# Patient Record
Sex: MALE | Race: WHITE | Employment: FULL TIME | URBAN - METROPOLITAN AREA
[De-identification: names, ages, dates, MRNs, and addresses within clinical notes are randomized per-mention and may not be internally consistent; named-entity substitution may affect disease eponyms.]

---

## 2017-08-30 ASSESSMENT — ACTIVITIES OF DAILY LIVING (ADL)
DO_MEMBERS_OF_YOUR_HOUSEHOLD_USE_SAFETY_HELMETS?: Y
ARE_THERE_FIREARMS_IN_YOUR_HOME?: N
ARE_THERE_CARBON_MONOXIDE_DETECTORS_IN_YOUR_HOME?: Y
ARE_THERE_SMOKE_DETECTORS_IN_YOUR_HOME?: Y
DO_MEMBERS_OF_YOUR_HOUSEHOLD_USE_SUNSCREEN?: Y
DO_MEMBERS_OF_YOUR_HOUSEHOLD_WEAR_SEAT_BELTS?: Y

## 2017-08-30 ASSESSMENT — ENCOUNTER SYMPTOMS
SKIN CHANGES: 0
POOR WOUND HEALING: 0
NAIL CHANGES: 1

## 2017-09-01 ENCOUNTER — OFFICE VISIT (OUTPATIENT)
Dept: FAMILY MEDICINE | Facility: CLINIC | Age: 37
End: 2017-09-01

## 2017-09-01 VITALS — RESPIRATION RATE: 20 BRPM | HEIGHT: 72 IN | WEIGHT: 165.8 LBS | OXYGEN SATURATION: 99 % | BODY MASS INDEX: 22.46 KG/M2

## 2017-09-01 DIAGNOSIS — R21 RASH AND NONSPECIFIC SKIN ERUPTION: Primary | ICD-10-CM

## 2017-09-01 ASSESSMENT — PAIN SCALES - GENERAL: PAINLEVEL: NO PAIN (0)

## 2017-09-01 NOTE — PROGRESS NOTES
"SUBJECTIVE:    Pt is a 37 year old male with pmh of     Patient Active Problem List   Diagnosis     Hydronephrosis, bilateral     Hearing loss     Imbalance       who is here for evaluation of had concerns including Physical.    Here for a physical.  Overall doing well. C/o mild red facial rash, new. Does get dandruff, uses special shampoo. Also, left great toe nail medial border getting discolored, using med his wife got from [place], topical, might be helping.     , two small children, works at [employer] in [place]--he will do free labs at [employer] for insurance and get me copy. Does flu shot at work.    See 2014 Dr Cabrera note for Urologic history summary; just prn f/u there    Could do more exercise, tough w/ job/kids, discussed options.    FH: mom thyroid disease, and possible heart trouble, dad also thyroid disease    Fatigue last year cleared when gave up coffee    Past Medical History:   Diagnosis Date     Hydronephrosis, bilateral      Past Surgical History:   Procedure Laterality Date     PYELOPLASTY       Allergies   Allergen Reactions     Nkda [No Known Drug Allergies]      Seasonal Allergies      \" congested sinuses\"           No current outpatient prescriptions on file.       Social History   Substance Use Topics     Smoking status: Never Smoker     Smokeless tobacco: Never Used     Alcohol use Yes      Comment: Glass of wine a couple times a week.         Answers for HPI/ROS submitted by the patient on 8/30/2017   Annual Exam:  General Symptoms: No  Skin Symptoms: Yes  HENT Symptoms: No  EYE SYMPTOMS: No  HEART SYMPTOMS: No  LUNG SYMPTOMS: No  INTESTINAL SYMPTOMS: No  URINARY SYMPTOMS: No  REPRODUCTIVE SYMPTOMS: No  SKELETAL SYMPTOMS: No  BLOOD SYMPTOMS: No  NERVOUS SYSTEM SYMPTOMS: No  MENTAL HEALTH SYMPTOMS: No  Changes in hair: No  Changes in moles/birth marks: No  Itching: No  Rashes: Yes  Changes in nails: Yes  Acne: No  Change in facial hair: No  Warts: No  Non-healing sores: No  Scarring: No  Flaking of " "skin: Yes  Color changes of hands/feet in cold : No  Sun sensitivity: No  Skin thickening: No  Frequency of exercise:: 2-3 days/week  Duration of exercise:: 15-30 minutes      OBJECTIVE:  Resp 20  Ht 1.822 m (5' 11.75\")  Wt 75.2 kg (165 lb 12.8 oz)  SpO2 99%  BMI 22.64 kg/m2  GENERAL APPEARANCE: Alert, no acute distress  EYES: PERRL, EOM normal, conjunctiva and lids normal  HENT: Ears and TMs normal, oral mucosa and posterior oropharynx normal  NECK: No adenopathy,masses or thyromegaly  RESP: lungs clear to auscultation   CV: normal rate, regular rhythm, no murmur or gallop  ABDOMEN: soft, no organomegaly, masses or tenderness   (male): penis, scrotum, testes normal, no hernias  LYMPHATICS: No cervical, supraclavicular or inguinal adenopathy  MS: extremities normal, no peripheral edema  SKIN: faint mac/pap rash between eyebrows/left nose border. Left great toe nail medial border white.  NEURO: Alert, oriented, speech and mentation normal  PSYCHE: mentation appears normal, affect and mood normal    ASSESSMENT/PLAN:    Rash: see derm, he'll ask about nail there too, could be fungal although just one side of nail  He'll do flu shot, lipids, sugar at work and get me copies labs  Add more exercise      FABIENNE LOPEZ MD      "

## 2017-09-01 NOTE — NURSING NOTE
Chief Complaint   Patient presents with     Physical     established physical   Mai Dinh LPN 8:03 AM on 9/1/2017

## 2017-09-01 NOTE — PATIENT INSTRUCTIONS
[facility] Medication Refill Request Information:  * Please contact your pharmacy regarding ANY request for medication refills.  ** [facility] Prescription Fax = [phone]  * Please allow 3 business days for routine medication refills.  * Please allow 5 business days for controlled substance medication refills.     [facility] Test Result notification information:  *You will be notified with in 7-10 days of your appointment day regarding the results of your test.  If you are on MyChart you will be notified as soon as the provider has reviewed the results and signed off on them.    [facility] [phone]

## 2017-09-01 NOTE — MR AVS SNAPSHOT
After Visit Summary   9/1/2017    Yuan Caldwell    MRN: [id number]           Patient Information     Date Of Birth          1980        Visit Information        Provider Department      9/1/2017 8:05 AM Evaritso Bonds MD [facility]        Today's Diagnoses     Rash and nonspecific skin eruption    -  1      Care Instructions    Primary Care Center Medication Refill Request Information:  * Please contact your pharmacy regarding ANY request for medication refills.  ** [facility] Prescription Fax = [phone]  * Please allow 3 business days for routine medication refills.  * Please allow 5 business days for controlled substance medication refills.     Primary Care Center Test Result notification information:  *You will be notified with in 7-10 days of your appointment day regarding the results of your test.  If you are on MyChart you will be notified as soon as the provider has reviewed the results and signed off on them.    [facility] [phone]           Follow-ups after your visit        Additional Services     DERMATOLOGY REFERRAL       Your provider has referred you to: [facility]: Dermatology Clinic [facility] [phone]   [email or web address]    Please be aware that coverage of these services is subject to the terms and limitations of your health insurance plan.  Call member services at your health plan with any benefit or coverage questions.      Please bring the following with you to your appointment:    (1) Any X-Rays, CTs or MRIs which have been performed.  Contact the facility where they were done to arrange for  prior to your scheduled appointment.    (2) List of current medications  (3) This referral request   (4) Any documents/labs given to you for this referral                  Who to contact     Please call your clinic at [phone] to:    Ask questions about your health    Make or cancel appointments    Discuss  "your medicines    Learn about your test results    Speak to your doctor   If you have compliments or concerns about an experience at your clinic, or if you wish to file a complaint, please contact [facility] Patient Relations at [phone] or email us at [email or web address]         Additional Information About Your Visit        MyChart Information     [employer] gives you secure access to your electronic health record. If you see a primary care provider, you can also send messages to your care team and make appointments. If you have questions, please call your primary care clinic.  If you do not have a primary care provider, please call [phone] and they will assist you.      [employer] is an electronic gateway that provides easy, online access to your medical records. With [employer], you can request a clinic appointment, read your test results, renew a prescription or communicate with your care team.     To access your existing account, please contact your [facility] or call [phone] for assistance.        Care EveryWhere ID     This is your Care EveryWhere ID. This could be used by other organizations to access your [place] medical records  [id number]        Your Vitals Were     Respirations Height Pulse Oximetry BMI (Body Mass Index)          20 1.822 m (5' 11.75\") 99% 22.64 kg/m2         Blood Pressure from Last 3 Encounters:   08/22/16 143/82   03/14/16 114/72   08/14/15 109/74    Weight from Last 3 Encounters:   09/01/17 75.2 kg (165 lb 12.8 oz)   08/22/16 74.7 kg (164 lb 9.6 oz)   03/14/16 72.6 kg (160 lb)              We Performed the Following     DERMATOLOGY REFERRAL        Primary Care Provider Office Phone # Fax #    Eavristo Bonds [phone]       [address] [address]        Equal Access to Services     MELANI NIELSEN AH: Suresh Calzada, sue motta, zo cavanaugh " yolanda gonzaleztierracuca la'aashanti ah. Erika [facility] [phone].    ATENCIÓN: Si habla xinañol, tiene a pimentel disposición servicios gratuitos de asistencia lingüística. Laura al [phone].    We comply with applicable federal civil rights laws and [place] laws. We do not discriminate on the basis of race, color, national origin, age, disability sex, sexual orientation or gender identity.            Thank you!     Thank you for choosing [facility]  for your care. Our goal is always to provide you with excellent care. Hearing back from our patients is one way we can continue to improve our services. Please take a few minutes to complete the written survey that you may receive in the mail after your visit with us. Thank you!             Your Updated Medication List - Protect others around you: Learn how to safely use, store and throw away your medicines at www.disposemymeds.org.      Notice  As of 9/1/2017  8:32 AM    You have not been prescribed any medications.

## 2017-11-16 ENCOUNTER — OFFICE VISIT (OUTPATIENT)
Dept: DERMATOLOGY | Facility: CLINIC | Age: 37
End: 2017-11-16

## 2017-11-16 DIAGNOSIS — Z79.899 ENCOUNTER FOR LONG-TERM (CURRENT) USE OF HIGH-RISK MEDICATION: ICD-10-CM

## 2017-11-16 DIAGNOSIS — Z79.899 ENCOUNTER FOR LONG-TERM (CURRENT) USE OF HIGH-RISK MEDICATION: Primary | ICD-10-CM

## 2017-11-16 DIAGNOSIS — B35.1 ONYCHOMYCOSIS: ICD-10-CM

## 2017-11-16 DIAGNOSIS — L21.9 DERMATITIS, SEBORRHEIC: ICD-10-CM

## 2017-11-16 DIAGNOSIS — R20.2 NOTALGIA PARESTHETICA: ICD-10-CM

## 2017-11-16 LAB
ALBUMIN SERPL-MCNC: 4 G/DL (ref 3.4–5)
ALP SERPL-CCNC: 81 U/L (ref 40–150)
ALT SERPL W P-5'-P-CCNC: 25 U/L (ref 0–70)
AST SERPL W P-5'-P-CCNC: 17 U/L (ref 0–45)
BILIRUB DIRECT SERPL-MCNC: 0.2 MG/DL (ref 0–0.2)
BILIRUB SERPL-MCNC: 0.9 MG/DL (ref 0.2–1.3)
PROT SERPL-MCNC: 7.4 G/DL (ref 6.8–8.8)

## 2017-11-16 RX ORDER — TERBINAFINE HYDROCHLORIDE 250 MG/1
250 TABLET ORAL DAILY
Qty: 30 TABLET | Refills: 2 | Status: SHIPPED | OUTPATIENT
Start: 2017-11-16 | End: 2018-09-10

## 2017-11-16 ASSESSMENT — PAIN SCALES - GENERAL: PAINLEVEL: NO PAIN (0)

## 2017-11-16 NOTE — MR AVS SNAPSHOT
After Visit Summary   11/16/2017    Yuan Caldwell    MRN: [id number]           Patient Information     Date Of Birth          1980        Visit Information        Provider Department      11/16/2017 8:30 AM Jigar Schwartz MD [facility]        Today's Diagnoses     Encounter for long-term (current) use of high-risk medication    -  1    Onychomycosis        Notalgia paresthetica        Dermatitis, seborrheic          Care Instructions    - Capsaicin cream for itching of spot on back.    - To lab today and again in 6 weeks.    - Start terbinafine          Follow-ups after your visit        Follow-up notes from your care team     Return in about 3 months (around 2/16/2018).      Your next 10 appointments already scheduled     Nov 16, 2017  9:15 AM CST   LAB with [facility] ([facility])    [address] [place] [id number] [phone]           Please do not eat 10-12 hours before your appointment if you are coming in fasting for labs on lipids, cholesterol, or glucose (sugar). This does not apply to pregnant women. Water, hot tea and black coffee (with nothing added) are okay. Do not drink other fluids, diet soda or chew gum.            Jan 04, 2018  9:30 AM CST   LAB with [facility] ([facility])    [address] [place] [id number] [phone]           Please do not eat 10-12 hours before your appointment if you are coming in fasting for labs on lipids, cholesterol, or glucose (sugar). This does not apply to pregnant women. Water, hot tea and black coffee (with nothing added) are okay. Do not drink other fluids, diet soda or chew gum.            Feb 15, 2018  9:00 AM CST   (Arrive by 8:45 AM)   Return Visit with Jigar Schwartz MD   [facility] ([facility])    [address] [place] [id number]    [phone]              Future tests that were ordered for you today     Open Future Orders        Priority Expected Expires Ordered    Hepatic panel Routine  11/16/2018 11/16/2017    Hepatic panel Routine 12/28/2017 1/4/2018 11/16/2017            Who to contact     Please call your clinic at [phone] to:    Ask questions about your health    Make or cancel appointments    Discuss your medicines    Learn about your test results    Speak to your doctor   If you have compliments or concerns about an experience at your clinic, or if you wish to file a complaint, please contact [facility] Patient Relations at [phone] or email us at [email or web address]         Additional Information About Your Visit        [employer] Information     [employer] gives you secure access to your electronic health record. If you see a primary care provider, you can also send messages to your care team and make appointments. If you have questions, please call your primary care clinic.  If you do not have a primary care provider, please call [phone] and they will assist you.      [employer] is an electronic gateway that provides easy, online access to your medical records. With [employer], you can request a clinic appointment, read your test results, renew a prescription or communicate with your care team.     To access your existing account, please contact your [facility] or call [phone] for assistance.        Care EveryWhere ID     This is your Care EveryWhere ID. This could be used by other organizations to access your [place] medical records  [id number]         Blood Pressure from Last 3 Encounters:   08/22/16 143/82   03/14/16 114/72   08/14/15 109/74    Weight from Last 3 Encounters:   09/01/17 75.2 kg (165 lb 12.8 oz)   08/22/16 74.7 kg (164 lb 9.6 oz)   03/14/16 72.6 kg (160 lb)                 Today's Medication Changes          These changes are  accurate as of: 11/16/17  9:01 AM.  If you have any questions, ask your nurse or doctor.               Start taking these medicines.        Dose/Directions    terbinafine 250 MG tablet   Commonly known as:  lamISIL   Used for:  Onychomycosis   Started by:  Jigar Schwartz MD        Dose:  250 mg   Take 1 tablet (250 mg) by mouth daily   Quantity:  30 tablet   Refills:  2            Where to get your medicines      These medications were sent to [facility] [address] [place] [address] [address] AT [facility]  [address], [facility] [address]     Phone:  [phone]     terbinafine 250 MG tablet                Primary Care Provider Office Phone # Fax #    Evaristo Bonds [phone]       [address] [address]        Equal Access to Services     [facility]: Hadii suzanna ku hadasho Soomaali, waaxda luqadaha, qaybta kaalmada adeegyada, waxay neshain hayaan beatriz nath . So [facility] [phone].    ATENCIÓN: Si habla español, tiene a pimentel disposición servicios gratuitos de asistencia lingüística. Llame al [phone].    We comply with applicable federal civil rights laws and [place] laws. We do not discriminate on the basis of race, color, national origin, age, disability, sex, sexual orientation, or gender identity.            Thank you!     Thank you for choosing [facility] DER[facility]  for your care. Our goal is always to provide you with excellent care. Hearing back from our patients is one way we can continue to improve our services. Please take a few minutes to complete the written survey that you may receive in the mail after your visit with us. Thank you!             Your Updated Medication List - Protect others around you: Learn how to safely use, store and throw away your medicines at www.disposemymeds.org.          This list is accurate as of: 11/16/17  9:01 AM.  Always use your most recent med list.                    Brand Name Dispense Instructions for use Diagnosis    terbinafine 250 MG tablet    lamISIL    30 tablet    Take 1 tablet (250 mg) by mouth daily    Onychomycosis

## 2017-11-16 NOTE — NURSING NOTE
Dermatology Rooming Note    Yuan Caldwell's goals for this visit include:   Chief Complaint   Patient presents with     Skin Check     Yuan is here today for a skin check and also a fungal infection on his great toe on the left foot.      RYDER Cates

## 2017-11-16 NOTE — PATIENT INSTRUCTIONS
- Capsaicin cream for itching of spot on back.    - To lab today and again in 6 weeks.    - Start terbinafine

## 2017-11-16 NOTE — PROGRESS NOTES
"[facility] Dermatology Note      Dermatology Problem List:  1.Seborrheic dermatitis: using Selsun Blue shampoo with good control  2. Onychomycosis: left great toe, terbinafine 250 mg daily x 3 months, started 11/16/2017  3. 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 Yuan Caldwell is a 37 year old male who presents as a referral from Dr. Bonds for evaluation of a rash. Patient reports that his rash has resolved at this point, but he is concerned about a toenail infection on his left foot. He would also like a full body skin check.    Regarding his rash, he says that he had a scaly red rash on his glabella that spread to just underneath his eye. He says it was there for at least two months, but noticed over the last two weeks that it completely went away.     He states that he also has a fungal infection of his left big toe. He says that a quarter of his toenail is affected and has been like this for 5 months. He applied a topical antifungal medication that his wife brought back from [facility]. He states that it didn't help his infection.    He denies any new or changing moles, itchy spots, or bleeding spots on his skin.    He is otherwise feeling healthy with no other concerns.      Past Medical History:   Patient Active Problem List   Diagnosis     Hydronephrosis, bilateral     Hearing loss     Imbalance     Past Medical History:   Diagnosis Date     Hydronephrosis, bilateral      Past Surgical History:   Procedure Laterality Date     PYELOPLASTY         Social History:  The patient works.    Family History:  No family hx of skin cancer.     Medications:  No current outpatient prescriptions on file.        Allergies   Allergen Reactions     Nkda [No Known Drug Allergies]      Seasonal Allergies      \" congested sinuses\"         Review of Systems:  -As per HPI  -Constitutional: The " patient denies fatigue, fevers, chills, unintended weight loss, and night sweats.  -HEENT: Patient denies nonhealing oral sores.  -Skin: As above in HPI. No additional skin concerns.    Physical exam:  Vitals: There were no vitals taken for this visit.  GEN: This is a well developed, well-nourished male in no acute distress, in a pleasant mood.    SKIN: Total skin excluding the undergarment areas was performed. The exam included the head/face, neck, both arms, chest, back, abdomen, both legs, digits and/or nails.   -Multiple regular brown pigmented macules and papules are identified on the scalp, face, extremities, and trunk.   -There is a large brown patch on the patient's left upper back that patient states has been stable in size since his last Dermatology visit  - There is a white crumbly  -No other lesions of concern on areas examined.     Impression/Plan:  1. Seborrheic dermatitis: patient uses Selsun blue shampoo for his scalp with good control of his dandruff. May continue to use Selsun blue as a face scrub if seborrhea returns on face.     2. Multiple benign nevi:    Patient reassured, no treatment indicated    3. Onychomycosis: left great toe    Start terbinafine 250 mg daily x 3 months    LFTs today and in 6 weeks    4. Hyperpigmented patch on left upper back: patient reports that this has been there for over 10 years and that it appeared during his adult life. DDx includes notalgia parastethica or post-inflammatory hyperpigmentation. Likely not Rizo's nevus or Cafe au lait given above history.    Patient may use capsaicin cream if itching becomes a concern.     CC Dr. Bonds on close of this encounter.  Follow-up in 3 months, earlier for new or changing lesions.       Dr. Sykes staffed the patient.    Staff Involved:  Resident(Jigar Schwartz)/Staff(as above)    I have personally examined this patient and agree with Dr. Schwartz' documentation and plan of care. I have reviewed and amended the resident's  note above. The documentation accurately reflects my clinical observations, diagnoses, treatment and follow-up plans.     Bailey Sykes MD  Dermatology Staff

## 2017-11-16 NOTE — LETTER
11/16/2017       RE: Yuan Caldwell  [address] [place] [address]     Dear Colleague,    Thank you for referring your patient, Yuan Caldwell, to the [facility] DERMATOLOGY at [facility]. Please see a copy of my visit note below.    [facility] Dermatology Note      Dermatology Problem List:  1.Seborrheic dermatitis: using Selsun Blue shampoo with good control  2. Onychomycosis: left great toe, terbinafine 250 mg daily x 3 months, started 11/16/2017  3. 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 Yuan Caldwell is a 37 year old male who presents as a referral from Dr. Bonds for evaluation of a rash. Patient reports that his rash has resolved at this point, but he is concerned about a toenail infection on his left foot. He would also like a full body skin check.    Regarding his rash, he says that he had a scaly red rash on his glabella that spread to just underneath his eye. He says it was there for at least two months, but noticed over the last two weeks that it completely went away.     He states that he also has a fungal infection of his left big toe. He says that a quarter of his toenail is affected and has been like this for 5 months. He applied a topical antifungal medication that his wife brought back from [facility]. He states that it didn't help his infection.    He denies any new or changing moles, itchy spots, or bleeding spots on his skin.    He is otherwise feeling healthy with no other concerns.      Past Medical History:   Patient Active Problem List   Diagnosis     Hydronephrosis, bilateral     Hearing loss     Imbalance     Past Medical History:   Diagnosis Date     Hydronephrosis, bilateral      Past Surgical History:   Procedure Laterality Date     PYELOPLASTY         Social History:  The patient works.    Family  "History:  No family hx of skin cancer.     Medications:  No current outpatient prescriptions on file.        Allergies   Allergen Reactions     Nkda [No Known Drug Allergies]      Seasonal Allergies      \" congested sinuses\"         Review of Systems:  -As per HPI  -Constitutional: The patient denies fatigue, fevers, chills, unintended weight loss, and night sweats.  -HEENT: Patient denies nonhealing oral sores.  -Skin: As above in HPI. No additional skin concerns.    Physical exam:  Vitals: There were no vitals taken for this visit.  GEN: This is a well developed, well-nourished male in no acute distress, in a pleasant mood.    SKIN: Total skin excluding the undergarment areas was performed. The exam included the head/face, neck, both arms, chest, back, abdomen, both legs, digits and/or nails.   -Multiple regular brown pigmented macules and papules are identified on the scalp, face, extremities, and trunk.   -There is a large brown patch on the patient's left upper back that patient states has been stable in size since his last Dermatology visit  - There is a white crumbly  -No other lesions of concern on areas examined.     Impression/Plan:  1. Seborrheic dermatitis: patient uses Selsun blue shampoo for his scalp with good control of his dandruff. May continue to use Selsun blue as a face scrub if seborrhea returns on face.     2. Multiple benign nevi:    Patient reassured, no treatment indicated    3. Onychomycosis: left great toe    Start terbinafine 250 mg daily x 3 months    LFTs today and in 6 weeks    4. Hyperpigmented patch on left upper back: patient reports that this has been there for over 10 years and that it appeared during his adult life. DDx includes notalgia parastethica or post-inflammatory hyperpigmentation. Likely not Rizo's nevus or Cafe au lait given above history.    Patient may use capsaicin cream if itching becomes a concern.     CC Dr. Bonds on close of this encounter.  Follow-up in 3 " months, earlier for new or changing lesions.       Dr. Sykes staffed the patient.    Staff Involved:  Resident(Jigar Schwatrz)/Staff(as above)    I have personally examined this patient and agree with Dr. Schwartz' documentation and plan of care. I have reviewed and amended the resident's note above. The documentation accurately reflects my clinical observations, diagnoses, treatment and follow-up plans.     Bailey Sykes MD  Dermatology Staff

## 2018-04-30 ENCOUNTER — OFFICE VISIT (OUTPATIENT)
Dept: FAMILY MEDICINE | Facility: CLINIC | Age: 38
End: 2018-04-30
Payer: COMMERCIAL

## 2018-04-30 VITALS
DIASTOLIC BLOOD PRESSURE: 76 MMHG | HEART RATE: 71 BPM | TEMPERATURE: 97.5 F | WEIGHT: 166.08 LBS | SYSTOLIC BLOOD PRESSURE: 131 MMHG | BODY MASS INDEX: 22.68 KG/M2 | OXYGEN SATURATION: 97 %

## 2018-04-30 DIAGNOSIS — L03.319 CELLULITIS OF TRUNK, UNSPECIFIED SITE OF TRUNK: Primary | ICD-10-CM

## 2018-04-30 RX ORDER — CEPHALEXIN 500 MG/1
500 CAPSULE ORAL 2 TIMES DAILY
Qty: 14 CAPSULE | Refills: 0 | Status: SHIPPED | OUTPATIENT
Start: 2018-04-30 | End: 2018-09-10

## 2018-04-30 ASSESSMENT — PAIN SCALES - GENERAL: PAINLEVEL: NO PAIN (0)

## 2018-04-30 NOTE — MR AVS SNAPSHOT
After Visit Summary   4/30/2018    Yuan Caldwell    MRN: [id number]           Patient Information     Date Of Birth          1980        Visit Information        Provider Department      4/30/2018 9:20 AM Bayron Ingram MD [facility]        Today's Diagnoses     Cellulitis of trunk, unspecified site of trunk    -  1       Follow-ups after your visit        Follow-up notes from your care team     Return if symptoms worsen or fail to improve.      Who to contact     Please call your clinic at [phone] to:    Ask questions about your health    Make or cancel appointments    Discuss your medicines    Learn about your test results    Speak to your doctor            Additional Information About Your Visit        [employer] Information     [address] gives you secure access to your electronic health record. If you see a primary care provider, you can also send messages to your care team and make appointments. If you have questions, please call your primary care clinic.  If you do not have a primary care provider, please call [phone] and they will assist you.      [address] is an electronic gateway that provides easy, online access to your medical records. With [address], you can request a clinic appointment, read your test results, renew a prescription or communicate with your care team.     To access your existing account, please contact your [facility] or call [phone] for assistance.        Care EveryWhere ID     This is your Care EveryWhere ID. This could be used by other organizations to access your [facility] medical records  [id number]        Your Vitals Were     Pulse Temperature Pulse Oximetry BMI (Body Mass Index)          71 97.5  F (36.4  C) (Oral) 97% 22.68 kg/m2         Blood Pressure from Last 3 Encounters:   04/30/18 131/76   08/22/16 143/82   03/14/16 114/72    Weight from Last 3 Encounters:   04/30/18 166 lb 1.3 oz (75.3 kg)   09/01/17 165 lb  12.8 oz (75.2 kg)   08/22/16 164 lb 9.6 oz (74.7 kg)              Today, you had the following     No orders found for display         Today's Medication Changes          These changes are accurate as of 4/30/18 10:52 AM.  If you have any questions, ask your nurse or doctor.               Start taking these medicines.        Dose/Directions    cephALEXin 500 MG capsule   Commonly known as:  KEFLEX   Used for:  Cellulitis of trunk, unspecified site of trunk        Dose:  500 mg   Take 1 capsule (500 mg) by mouth 2 times daily   Quantity:  14 capsule   Refills:  0            Where to get your medicines      These medications were sent to [facility] [address] [place] [address] [address] AT [facility] [employer] [place]  [address], [facility] [address]     Phone:  [phone]     cephALEXin 500 MG capsule                Primary Care Provider Office Phone # Fax #    Evaristo Bonds -[phone]        [address] [address]        Equal Access to Services     [facility] AH: Hadii aad ku hadasho Soomaali, waaxda luqadaha, qaybta kaalmada adeegyada, waxay idiin haypennie nath . So [facility] [phone].    ATENCIÓN: Si habla español, tiene a pimentel disposición servicios gratuitos de asistencia lingüística. Llame al [phone].    We comply with applicable federal civil rights laws and [place] laws. We do not discriminate on the basis of race, color, national origin, age, disability, sex, sexual orientation, or gender identity.            Thank you!     Thank you for choosing [facility]  for your care. Our goal is always to provide you with excellent care. Hearing back from our patients is one way we can continue to improve our services. Please take a few minutes to complete the written survey that you may receive in the mail after your visit with us. Thank you!             Your Updated Medication List - Protect others around you:  Learn how to safely use, store and throw away your medicines at www.disposemymeds.org.          This list is accurate as of 4/30/18 10:52 AM.  Always use your most recent med list.                   Brand Name Dispense Instructions for use Diagnosis    cephALEXin 500 MG capsule    KEFLEX    14 capsule    Take 1 capsule (500 mg) by mouth 2 times daily    Cellulitis of trunk, unspecified site of trunk       terbinafine 250 MG tablet    lamISIL    30 tablet    Take 1 tablet (250 mg) by mouth daily    Onychomycosis

## 2018-04-30 NOTE — PROGRESS NOTES
CHIEF COMPLAINT:  Possible boil on back.      HISTORY OF PRESENT ILLNESS:  Yuan is a 37 year old who normally sees Dr. Franko Bonds at the [facility] Center at the [facility] but he works just 3 blocks away from our clinic and has been here before.      He developed a somewhat painful lesion on his back approximately 48 hours ago.  It was somewhat painful over the weekend but it has gotten better.  His wife was concerned about it and wanted him to be seen.      He has no fever.  Otherwise, feels just fine.  He has never had anything quite like this in the past.      ACTIVE MEDICAL PROBLEMS:  Minimal and noted.      CURRENT MEDICATIONS:     1. None.      OBJECTIVE:  Yuan is in absolutely no distress.  He is afebrile with a temperature of 97.5.  O2 sats are 97% on room air.  Vital signs are stable.  Examination of the back reveals an area of erythema that runs vertically about 3 cm in height and approximately a little over 1 cm in width.  There seems to be a mole in the center of it that likely predates all of this.  The skin blanches easily when pressed.  There is no pus or discharge expressed upon palpation.  He is really not that tender today.      ASSESSMENT AND PLAN:   1. Mild cellulitis, back.  This is right over the spinous process and there is a small mole.  I suspect that dry winter skin irritated this a little bit and something like Staph-epidermidis is likely responsible.  Therefore, I am going to go ahead and give him a course of cephalexin 500 mg, 1 tablet twice daily for 7 days.  He can get both doses in today at this point and then stretch it out to every 12 hours for the remaining 6 days.  I took a photograph of it and measured it.  We can easily compare this if he needs to return.  His wife will keep an eye on it.     2. Call with any problems or questions.

## 2018-04-30 NOTE — NURSING NOTE
37 year old  Chief Complaint   Patient presents with     Derm Problem     red spot on the middle of his back,        Blood pressure 131/76, pulse 71, temperature 97.5  F (36.4  C), temperature source Oral, weight 166 lb 1.3 oz (75.3 kg), SpO2 97 %. Body mass index is 22.68 kg/(m^2).  Patient Active Problem List   Diagnosis     Hydronephrosis, bilateral     Hearing loss     Imbalance       Wt Readings from Last 2 Encounters:   04/30/18 166 lb 1.3 oz (75.3 kg)   09/01/17 165 lb 12.8 oz (75.2 kg)     BP Readings from Last 3 Encounters:   04/30/18 131/76   08/22/16 143/82   03/14/16 114/72         Current Outpatient Prescriptions   Medication     terbinafine (LAMISIL) 250 MG tablet     No current facility-administered medications for this visit.        Social History   Substance Use Topics     Smoking status: Never Smoker     Smokeless tobacco: Never Used     Alcohol use Yes      Comment: Glass of wine a couple times a week.       Health Maintenance Due   Topic Date Due     HIV SCREEN (SYSTEM ASSIGNED)  05/01/1996       No results found for: RADHA PughPKeithNKeith  April 30, 2018 9:31 AM

## 2018-09-10 ENCOUNTER — OFFICE VISIT (OUTPATIENT)
Dept: FAMILY MEDICINE | Facility: CLINIC | Age: 38
End: 2018-09-10
Payer: COMMERCIAL

## 2018-09-10 VITALS
SYSTOLIC BLOOD PRESSURE: 109 MMHG | RESPIRATION RATE: 20 BRPM | DIASTOLIC BLOOD PRESSURE: 72 MMHG | HEART RATE: 76 BPM | BODY MASS INDEX: 21.99 KG/M2 | WEIGHT: 161 LBS

## 2018-09-10 DIAGNOSIS — Z00.00 ROUTINE HISTORY AND PHYSICAL EXAMINATION OF ADULT: Primary | ICD-10-CM

## 2018-09-10 ASSESSMENT — PAIN SCALES - GENERAL: PAINLEVEL: NO PAIN (0)

## 2018-09-10 NOTE — MR AVS SNAPSHOT
After Visit Summary   9/10/2018    Yuan Caldwell    MRN: [id number]           Patient Information     Date Of Birth          1980        Visit Information        Provider Department      9/10/2018 8:20 AM Evaristo Bonds MD [facility]        Today's Diagnoses     Routine history and physical examination of adult    -  1      Care Instructions    Primary Care Center Medication Refill Request Information:  * Please contact your pharmacy regarding ANY request for medication refills.  ** [facility] Prescription Fax = [phone]  * Please allow 3 business days for routine medication refills.  * Please allow 5 business days for controlled substance medication refills.     Primary Care Center Test Result notification information:  *You will be notified with in 7-10 days of your appointment day regarding the results of your test.  If you are on MyChart you will be notified as soon as the provider has reviewed the results and signed off on them.    [facility]: [phone]           Follow-ups after your visit        Who to contact     Please call your clinic at [phone] to:    Ask questions about your health    Make or cancel appointments    Discuss your medicines    Learn about your test results    Speak to your doctor            Additional Information About Your Visit        MyChart Information     [facility] gives you secure access to your electronic health record. If you see a primary care provider, you can also send messages to your care team and make appointments. If you have questions, please call your primary care clinic.  If you do not have a primary care provider, please call [phone] and they will assist you.      [facility] is an electronic gateway that provides easy, online access to your medical records. With [facility], you can request a clinic appointment, read your test results, renew a prescription or communicate with your care team.     To access your existing  account, please contact your [facility] or call [phone] for assistance.        Care EveryWhere ID     This is your Care EveryWhere ID. This could be used by other organizations to access your [facility] medical records  [id number]        Your Vitals Were     Pulse Respirations BMI (Body Mass Index)             76 20 21.99 kg/m2          Blood Pressure from Last 3 Encounters:   09/10/18 109/72   04/30/18 131/76   08/22/16 143/82    Weight from Last 3 Encounters:   09/10/18 73 kg (161 lb)   04/30/18 75.3 kg (166 lb 1.3 oz)   09/01/17 75.2 kg (165 lb 12.8 oz)              Today, you had the following     No orders found for display       Primary Care Provider Office Phone # Fax #    Evaristo Bonds -[phone]       [address] [address]        Equal Access to Services     MELANI NIELSEN : Hadii suzanna borrego hadasho Sojw, waaxda luqadaha, qaybta kaalmada adeegyada, yolanda nath . So [facility] [phone].    ATENCIÓN: Si habla español, tiene a pimentel disposición servicios gratuitos de asistencia lingüística. Llame al [phone].    We comply with applicable federal civil rights laws and [place] laws. We do not discriminate on the basis of race, color, national origin, age, disability, sex, sexual orientation, or gender identity.            Thank you!     Thank you for choosing [facility]  for your care. Our goal is always to provide you with excellent care. Hearing back from our patients is one way we can continue to improve our services. Please take a few minutes to complete the written survey that you may receive in the mail after your visit with us. Thank you!             Your Updated Medication List - Protect others around you: Learn how to safely use, store and throw away your medicines at www.disposemymeds.org.      Notice  As of 9/10/2018  9:57 AM    You have not been prescribed any medications.

## 2018-09-10 NOTE — PROGRESS NOTES
"[facility]  Primary Care [place]   Evaristo Bonds MD  09/10/2018        Chief Complaint:   Physical       History of Present Illness:   Yuan Caldewll is a 38 year old male with a history of hydronephrosis who presents for a physical.    Fungal infection of nail: He visited dermatology on 11/16/17, for evaluation of an infection on his left big toe and seborrheic dermatitis. He was treated with selsun blue shampoo to treat the seborrheic dermatitis. Additionally, he was treated with terbinafine, but discontinued use soon after his appointment due to concerns of the effect the drug would have on his health. Alternatively he treated the infection with tea tree oil for about 6 weeks and notes that he does not notice it today.     Cellulitis: He visited Dr. Ingram on 04/30 for evaluation of cellulitis on back. Today, he notes that his infection is absent.     Daily Health: He denies that he exercises daily, although, he states that he walks 6 floors of steps everyday for work. He notes that he wishes he could do more exercise. He has noticed that he has had more fat \"building up\" on his body.     Recent labs: He had labs performed through the [facility] for work on insurance with results below.   Total Cholesterol- 231   HDL cholesterol- 30   LDL cholesterol- 180   Triglycerides- 105     Blood glucose- 82     Health Care Maintenance  1. Flu shot- discussed   2. See urology note from 2014. PRN for follow up.      Review of Systems:   Pertinent items are noted in HPI, remainder of complete ROS is negative.      Active Medications:   No current outpatient prescriptions on file.      Allergies:   Nkda [no known drug allergies] and Seasonal allergies      Past Medical History:  Hydronephrosis   Hearing loss  Imbalance     Past Surgical History:  Pyeloplasty     Family History:   Cancer      Social History:   The patient was alone   Smoking Status: never  Smokeless Tobacco: never   Alcohol Use: yes   2 " children ages 5 & 6   He works in IT     Physical Exam:   /72 (BP Location: Right arm, Patient Position: Sitting, Cuff Size: Adult Regular)  Pulse 76  Resp 20  Wt 73 kg (161 lb)  BMI 21.99 kg/m2   Constitutional: Alert. In no distress.  Head: Normocephalic. No masses, lesions, tenderness or abnormalities.  ENT: No neck nodes or sinus tenderness.  Cardiovascular: RRR. No murmurs, clicks, gallops, or rub.  Respiratory: Clear to auscultation bilaterally, no wheezes or crackles.  Gastrointestinal: Abdomen soft. Non-tender. BS normal. No masses or organomegaly.  : no inguinal hernias, normal scrotum, penis, testes normal, rectal exam normal prostate normal  Musculoskeletal: Extremities normal. No gross deformities noted. Normal muscle tone.  Skin:  Left shoulder blade birth adeel. Multiple pigmented regions. Seen 04/2018 for dermatology. No suspicious lesions. No rashes.  Neurologic: Gait normal. Reflexes normal and symmetric. Sensation grossly WNL.  Psychiatric: Mentation appears normal. Normal affect.   Hematologic/Lymphatic/Immunologic: Normal cervical lymph nodes.     Assessment and Plan:    Health Maintenance:   He will receive his annual flu shot through work.     High blood cholesterol:   Labs were performed through his work, and his recent LDL cholesterol levels were slightly high at 180. We discussed that he should exercise more often. He does have high blood pressure in the family. He will continue to have his labs performed through his work and bring me the copy for insurance purposes. I have decided to hold off on blood cholesterol  medication due to his lack of smoking, high blood pressure, diabetes and obesity.     Follow-up: One year        Scribe Disclosure:   I, Zahra Cooper, am serving as a scribe to document services personally performed by Evaristo Bonds MD at this visit, based upon the provider's statements to me. All documentation has been reviewed by the aforementioned  provider prior to being entered into the official medical record.     Portions of this medical record were completed by a scribe. UPON MY REVIEW AND AUTHENTICATION BY ELECTRONIC SIGNATURE, this confirms (a) I performed the applicable clinical services, and (b) the record is accurate.     Answers for HPI/ROS submitted by the patient on 9/7/2018   General Symptoms: No  Skin Symptoms: No  HENT Symptoms: No  EYE SYMPTOMS: No  HEART SYMPTOMS: No  LUNG SYMPTOMS: No  INTESTINAL SYMPTOMS: No  URINARY SYMPTOMS: No  REPRODUCTIVE SYMPTOMS: No  SKELETAL SYMPTOMS: No  BLOOD SYMPTOMS: No  NERVOUS SYSTEM SYMPTOMS: No  MENTAL HEALTH SYMPTOMS: No  Evaristo Bonds MD

## 2018-09-10 NOTE — PATIENT INSTRUCTIONS
[facility] Medication Refill Request Information:  * Please contact your pharmacy regarding ANY request for medication refills.  ** [facility] Prescription Fax = [phone]  * Please allow 3 business days for routine medication refills.  * Please allow 5 business days for controlled substance medication refills.     [facility] Test Result notification information:  *You will be notified with in 7-10 days of your appointment day regarding the results of your test.  If you are on MyChart you will be notified as soon as the provider has reviewed the results and signed off on them.    [facility]: [phone]

## 2018-09-10 NOTE — NURSING NOTE
Chief Complaint   Patient presents with     Physical     annual physical   Mai Dinh LPN 8:47 AM on 9/10/2018    Will get flu shot at [facility] .Mai Dinh LPN 8:47 AM on 9/10/2018

## 2019-07-08 ENCOUNTER — OFFICE VISIT (OUTPATIENT)
Dept: FAMILY MEDICINE | Facility: CLINIC | Age: 39
End: 2019-07-08
Payer: COMMERCIAL

## 2019-07-08 VITALS
SYSTOLIC BLOOD PRESSURE: 128 MMHG | BODY MASS INDEX: 22.26 KG/M2 | TEMPERATURE: 98.3 F | WEIGHT: 163 LBS | OXYGEN SATURATION: 97 % | HEART RATE: 83 BPM | DIASTOLIC BLOOD PRESSURE: 91 MMHG

## 2019-07-08 DIAGNOSIS — R07.0 THROAT PAIN: Primary | ICD-10-CM

## 2019-07-08 LAB — S PYO AG THROAT QL IA.RAPID: POSITIVE

## 2019-07-08 RX ORDER — PENICILLIN V POTASSIUM 500 MG/1
500 TABLET, FILM COATED ORAL 2 TIMES DAILY
Qty: 20 TABLET | Refills: 0 | Status: SHIPPED | OUTPATIENT
Start: 2019-07-08 | End: 2021-07-20

## 2019-07-08 RX ORDER — IBUPROFEN 400 MG/1
400 TABLET, FILM COATED ORAL EVERY 6 HOURS PRN
COMMUNITY
End: 2021-08-17

## 2019-07-08 NOTE — NURSING NOTE
39 year old  Chief Complaint   Patient presents with     Throat Problem     Sore throat since last Tuesday, not getting any better. Just got back from [place].     Ear Problem     pt reports his ears have been popping and ear pressure.        Blood pressure (!) 128/91, pulse 83, temperature 98.3  F (36.8  C), temperature source Oral, weight 73.9 kg (163 lb), SpO2 97 %. Body mass index is 22.26 kg/m .  Patient Active Problem List   Diagnosis     Hydronephrosis, bilateral     Hearing loss     Imbalance       Wt Readings from Last 2 Encounters:   07/08/19 73.9 kg (163 lb)   09/10/18 73 kg (161 lb)     BP Readings from Last 3 Encounters:   07/08/19 (!) 128/91   09/10/18 109/72   04/30/18 131/76         Current Outpatient Medications   Medication     ibuprofen (ADVIL/MOTRIN) 400 MG tablet     No current facility-administered medications for this visit.        Social History     Tobacco Use     Smoking status: Never Smoker     Smokeless tobacco: Never Used   Substance Use Topics     Alcohol use: Yes     Comment: Glass of wine a couple times a week.     Drug use: No       Health Maintenance Due   Topic Date Due     PHQ-2  01/01/2019       No results found for: PAP      July 8, 2019 8:56 AM

## 2019-07-08 NOTE — PATIENT INSTRUCTIONS
Yuan is a 40 yo with pharyngitis.    Results for orders placed or performed in visit on 07/08/19   Rapid Strep Screen (Group) ([place])   Result Value Ref Range    Rapid Strep A Screen POSITIVE (A) Negative     A/ Positive Strep. Still, question of carrier as he's had several positive tests in the past.     P/.  Treat with Penicillin, fluids, relative rest.   Let us know if no improvement   Also, return to clinic for a strep test while Asymptomatic and/or speak with Dr. Bonds.

## 2019-07-08 NOTE — PROGRESS NOTES
REASON FOR VISIT:  Throat Pain      HISTORY OF PRESENT ILLNESS: Yuan Caldwell is a 39 year old male who presents for throat pain, which began on 7/2/2019.  He reports sx began with a sore throat, which has worsened, along with occasional chills.  He notes some intermittent fatigue recently, stating he had to drink 2 cups of coffee this morning to feel fully awake.  He has not noticed swollen lymphnodes, but believes his tonsils have felt swollen over the past few days.  He notes hot beverages have helped, and notes sx fluctuate throughout the day.  He reports just returning from [facility], and has noticed his children get strep throat often.  He has noticed an increase in positive strep throat tests since he had children, particularly in the past 3-4 years.  No known medication allergies.  No headache, cough, stomach pain or fever.        SOCIAL HISTORY:   Social History     Social History Narrative    He does IT work, programming, for the [employer]           REVIEW OF SYSTEMS:  POSITIVE for sore throat, intermittent fatigue and occasional chills.  Negative for headache, cough, stomach pain or fever. Otherwise, the ROS is negative.      OBJECTIVE:      EXAM: Yuan is a pleasant 39 year old male.  VITAL SIGNS: BP (!) 128/91   Pulse 83   Temp 98.3  F (36.8  C) (Oral)   Wt 73.9 kg (163 lb)   SpO2 97%   BMI 22.26 kg/m    Constitutional: healthy, alert and no distress   Oropharynx: Tonsils at +3 with moderate erythema, no exudate.   Neck: Shotty lymphadenpathy on LEFT lymphatic chain  Cardiovascular: PMI normal. No lifts, heaves, or thrills. RRR. No murmurs, clicks gallops or rub  Respiratory: Clear bilaterally    Abdomen: Non-tender    LABORATORY DATA: see below      ASSESSMENT AND PLAN:   1.Yuan is a 38 yo with pharyngitis.    Results for orders placed or performed in visit on 07/08/19   Rapid Strep Screen (Group) ([facility])   Result Value Ref Range    Rapid Strep A Screen POSITIVE (A) Negative      A/ Positive Strep. Still, question of carrier as he's had several positive tests in the past.     P/.  Treat with Penicillin, fluids, relative rest.   Let us know if no improvement   Also, return to clinic for a strep test while Asymptomatic and/or speak with Dr. Bonds.      Call with any problems or questions.       I, Lake Martinez, am serving as a scribe to document services personally performed by Godfrey Mcneil MD based on data collection and the provider's statements to me. Dr. Mcneil has reviewed, edited and approved the above note.     --Godfrey Mcneil MD

## 2019-09-10 ASSESSMENT — ENCOUNTER SYMPTOMS
MEMORY LOSS: 1
SPEECH CHANGE: 0
WEAKNESS: 0
SEIZURES: 0
LOSS OF CONSCIOUSNESS: 0
DISTURBANCES IN COORDINATION: 0
NUMBNESS: 0
DIZZINESS: 1
PARALYSIS: 0
HEADACHES: 0
TINGLING: 0
TREMORS: 0

## 2019-09-11 ENCOUNTER — OFFICE VISIT (OUTPATIENT)
Dept: FAMILY MEDICINE | Facility: CLINIC | Age: 39
End: 2019-09-11
Payer: COMMERCIAL

## 2019-09-11 DIAGNOSIS — Z00.00 ROUTINE HISTORY AND PHYSICAL EXAMINATION OF ADULT: Primary | ICD-10-CM

## 2019-09-11 ASSESSMENT — PAIN SCALES - GENERAL: PAINLEVEL: NO PAIN (0)

## 2019-09-11 NOTE — NURSING NOTE
Chief Complaint   Patient presents with     Physical     Pt here for annual physcial      Raeann KARTIK Sin at 10:06 AM sign on 9/11/2019

## 2019-09-11 NOTE — PROGRESS NOTES
[facility]  Primary Care Center   Evaristo Bonds MD  09/11/2019      Chief Complaint:   Physical     History of Present Illness:   Yuan Caldwell is a 39 year old male who presents for an annual physical.  He has had no major health concerns in the past year.  He did have strep throat once or twice.  His father was here for a while over the summer visiting which was enjoyable.      He has biometric screening done through [facility] every year and has the most recent results with him.  3/12/19:  Total cholesterol 155  HDL cholesterol - 24  LDL cholesterol - 112  Triglycerides - 93  Blood glucose - 90    He does stretching exercises after showing in the morning.  He does not do dedicated cardio exercise however he is active at work during the day, does the stairs in his building several times a day, and will go for a walk if the weather permits.  He tries to eat fairly healthy.  He has cut down on meat, especially red meat, and is trying to eat more nuts, whole grains, eggs, and avocado.  In the past year, he has not been eating as many greens and vegetables compared to when they had a [employer] subscription.  He does eat fruit regularly.    Other concerns discussed:  1. He has a health care directive that he would like put on file.  He is Sabianist and does not want to receive blood products.      Review of Systems:   Pertinent items are noted in HPI or as in patient entered ROS below, remainder of complete ROS is negative.     Active Medications:      ibuprofen (ADVIL/MOTRIN) 400 MG tablet, Take 400 mg by mouth every 6 hours as needed for moderate pain, Disp: , Rfl:      Allergies:   No known drug allergies.       Past Medical History:  Hydronephrosis, bilateral  Hearing loss  Seasonal allergies     Past Surgical History:  Pyeloplasty     Family History:   Cancer - maternal grandfather, paternal grandmother   Father and mother are generally healthy  1 brother who is generally healthy     Social History:   Presents  to clinic alone  Tobacco Use: No previous or current tobacco use.   Alcohol Use: Glass of wine or beer occasionally  2 daughters - ages 6 and 8  Works in IT     Physical Exam:   BP (P) 137/79 (BP Location: Right arm, Patient Position: Sitting, Cuff Size: Adult Regular)   Pulse (P) 80   Wt (P) 74.9 kg (165 lb 3.2 oz)   SpO2 (P) 97%   BMI (P) 22.56 kg/m         Constitutional: Alert. In no distress.  Head: Normocephalic. No masses, lesions, tenderness or abnormalities.  ENT: No neck nodes or sinus tenderness.  Cardiovascular: RRR. No murmurs, clicks, gallops, or rub.  Respiratory: Clear to auscultation bilaterally, no wheezes or crackles.  Gastrointestinal: Abdomen soft. Non-tender. BS normal. No masses or organomegaly.  : no inguinal hernias, normal scrotum, penis, testes normal, rectal exam normal prostate normal      Musculoskeletal: Extremities normal. No gross deformities noted. Normal muscle tone.  Skin: No suspicious lesions. No rashes.  Neurologic: Gait normal. Reflexes normal and symmetric. Sensation grossly WNL.  Psychiatric: Mentation appears normal. Normal affect.   Hematologic/Lymphatic/Immunologic: Normal cervical lymph nodes.        Assessment and Plan:  There are no diagnoses linked to this encounter.   Excellent outside labs.  He will try to add in more regular exercise.  He will get a flu shot at work.    Follow-up: Return in about 1 year (around 9/11/2020).         Scribe Disclosure:  I, Mayra Harding, am serving as a scribe to document services personally performed by Evaristo Bonds MD at this visit, based upon the provider's statements to me. All documentation has been reviewed by the aforementioned provider prior to being entered into the official medical record.     Portions of this medical record were completed by a scribe. UPON MY REVIEW AND AUTHENTICATION BY ELECTRONIC SIGNATURE, this confirms (a) I performed the applicable clinical services, and (b) the record is accurate.      Answers for HPI/ROS submitted by the patient on 9/10/2019   General Symptoms: No  Skin Symptoms: No  HENT Symptoms: No  EYE SYMPTOMS: No  HEART SYMPTOMS: No  LUNG SYMPTOMS: No  INTESTINAL SYMPTOMS: No  URINARY SYMPTOMS: No  REPRODUCTIVE SYMPTOMS: No  SKELETAL SYMPTOMS: No  BLOOD SYMPTOMS: No  NERVOUS SYSTEM SYMPTOMS: Yes  MENTAL HEALTH SYMPTOMS: No  Trouble with coordination: No  Dizziness or trouble with balance: Yes  Fainting or black-out spells: No  Memory loss: Yes  Headache: No  Seizures: No  Speech problems: No  Tingling: No  Tremor: No  Weakness: No  Difficulty walking: No  Paralysis: No  Numbness: No  Evaritso Bonds MD MD

## 2019-09-16 ENCOUNTER — DOCUMENTATION ONLY (OUTPATIENT)
Dept: OTHER | Facility: CLINIC | Age: 39
End: 2019-09-16

## 2019-11-07 ENCOUNTER — HEALTH MAINTENANCE LETTER (OUTPATIENT)
Age: 39
End: 2019-11-07

## 2019-11-19 ENCOUNTER — OFFICE VISIT (OUTPATIENT)
Dept: FAMILY MEDICINE | Facility: CLINIC | Age: 39
End: 2019-11-19
Payer: COMMERCIAL

## 2019-11-19 VITALS
HEIGHT: 71 IN | TEMPERATURE: 97.6 F | SYSTOLIC BLOOD PRESSURE: 138 MMHG | HEART RATE: 67 BPM | RESPIRATION RATE: 16 BRPM | OXYGEN SATURATION: 98 % | DIASTOLIC BLOOD PRESSURE: 77 MMHG | BODY MASS INDEX: 24.04 KG/M2 | WEIGHT: 171.75 LBS

## 2019-11-19 DIAGNOSIS — J02.0 STREPTOCOCCAL SORE THROAT: Primary | ICD-10-CM

## 2019-11-19 LAB — S PYO AG THROAT QL IA.RAPID: NEGATIVE

## 2019-11-19 ASSESSMENT — MIFFLIN-ST. JEOR: SCORE: 1702.79

## 2019-11-19 NOTE — NURSING NOTE
"39 year old  Chief Complaint   Patient presents with     Pharyngitis     sore throat x 4 days, fatigue, cough x 2 weeks,        Blood pressure 138/77, pulse 67, temperature 97.6  F (36.4  C), temperature source Oral, resp. rate 16, height 1.798 m (5' 10.79\"), weight 77.9 kg (171 lb 12 oz), SpO2 98 %. Body mass index is 24.1 kg/m .  Patient Active Problem List   Diagnosis     Hydronephrosis, bilateral     Hearing loss     Imbalance     Patient is Episcopal       Wt Readings from Last 2 Encounters:   11/19/19 77.9 kg (171 lb 12 oz)   09/11/19 (P) 74.9 kg (165 lb 3.2 oz)     BP Readings from Last 3 Encounters:   11/19/19 138/77   09/11/19 (P) 137/79   07/08/19 (!) 128/91         Current Outpatient Medications   Medication     ibuprofen (ADVIL/MOTRIN) 400 MG tablet     penicillin V (VEETID) 500 MG tablet     No current facility-administered medications for this visit.        Social History     Tobacco Use     Smoking status: Never Smoker     Smokeless tobacco: Never Used   Substance Use Topics     Alcohol use: Yes     Comment: Glass of wine a couple times a week.     Drug use: No       Health Maintenance Due   Topic Date Due     INFLUENZA VACCINE (1) 09/01/2019       No results found for: PAP      November 19, 2019 11:27 AM    "

## 2019-11-19 NOTE — PROGRESS NOTES
SUBJECTIVE:   Yuan Caldwell is a 39 year old male who presents to clinic today to discuss the following problem(s).    Sore throat  - going on for the past 4 days  - low energy  - body aches  - daughter and wife have been sick but didn't need to go to the doctor  - a little bit of a cough that started 2.5 weeks ago, maybe getting a little better  - not sure of a fever as he has been taking ibuprofen regularly   - otherwise negative ROS as noted below    ROS:   CONSTITUTIONAL: Fatigue  ENT/MOUTH: Sore throat. Moderate nasal congestion, postnasal drainage and rhinorrhea  RESP: Cough  CV: NEGATIVE for chest pain/chest pressure, dyspnea on exertion  GI: NEGATIVE for abdominal pain, diarrhea, dysphagia and nausea  : NEGATIVE for dysuria, frequency and hematuria  MUSCULOSKELETAL: General body aches  HEME/ALLERGY/IMMUNE: Swollen nodes  PSYCHIATRIC: NEGATIVE    Today's PHQ-2:  PHQ-2 ( 1999 Pfizer) 11/19/2019 9/11/2019   Q1: Little interest or pleasure in doing things 0 1   Q2: Feeling down, depressed or hopeless 0 0   PHQ-2 Score 0 1       Past Medical History:   Diagnosis Date     Hydronephrosis, bilateral      Past Surgical History:   Procedure Laterality Date     PYELOPLASTY       Family History   Problem Relation Age of Onset     Cancer Maternal Grandfather      Cancer Paternal Grandmother      Social History     Tobacco Use     Smoking status: Never Smoker     Smokeless tobacco: Never Used   Substance Use Topics     Alcohol use: Yes     Comment: Glass of wine a couple times a week.     Drug use: No     Social History     Social History Narrative    He does IT work, programming, for the [employer]       Current Outpatient Medications   Medication     ibuprofen (ADVIL/MOTRIN) 400 MG tablet     penicillin V (VEETID) 500 MG tablet     No current facility-administered medications for this visit.      I have reviewed the patient's past medical, surgical, family, and social history.     OBJECTIVE:   /77  "(BP Location: Right arm, Patient Position: Sitting, Cuff Size: Adult Large)   Pulse 67   Temp 97.6  F (36.4  C) (Oral)   Resp 16   Ht 1.798 m (5' 10.79\")   Wt 77.9 kg (171 lb 12 oz)   SpO2 98%   BMI 24.10 kg/m      Constitutional: alert, appears stated age  Eyes: conjunctivae without erythema, sclera anicteric.  ENT: audible congestion, lymphedema, tonsillar erythema and swelling   Cardiac: regular rate and rhythm, normal S1/S2, no murmur/rubs/gallops  Respiratory: cough, lungs clear to auscultation bilaterally, normal work of breathing, no wheezes/crackles  Skin: no rashes, lesions, or wounds  Psych: affect is full and appropriate, speech is fluent and non-pressured    ASSESSMENT AND PLAN:     Yuan was seen today for pharyngitis.    Diagnoses and all orders for this visit:    Streptococcal sore throat  -     Rapid Strep Screen (Group) ([place])  -     Throat Culture Aerobic Bacterial    Negative rapid strep. Very low suspicion for PNA with no need for CXR at this point. Chief suspicion for viral URI. Discussed symptom management and advised patient of concerning symptoms indicating a need for further medical advice as noted in patient instructions.         Ryan Simon MD  [facility]  11/19/2019, 11:37 AM    "

## 2019-11-19 NOTE — PATIENT INSTRUCTIONS

## 2019-11-21 LAB
BACTERIA SPEC CULT: NORMAL
Lab: NORMAL
SPECIMEN SOURCE: NORMAL

## 2020-07-29 ENCOUNTER — MYC MEDICAL ADVICE (OUTPATIENT)
Dept: FAMILY MEDICINE | Facility: CLINIC | Age: 40
End: 2020-07-29

## 2020-07-29 DIAGNOSIS — J02.9 SORE THROAT: Primary | ICD-10-CM

## 2020-11-20 ENCOUNTER — OFFICE VISIT (OUTPATIENT)
Dept: FAMILY MEDICINE | Facility: CLINIC | Age: 40
End: 2020-11-20
Payer: COMMERCIAL

## 2020-11-20 VITALS
DIASTOLIC BLOOD PRESSURE: 89 MMHG | OXYGEN SATURATION: 97 % | SYSTOLIC BLOOD PRESSURE: 137 MMHG | WEIGHT: 173 LBS | HEART RATE: 94 BPM | BODY MASS INDEX: 24.27 KG/M2

## 2020-11-20 DIAGNOSIS — Z00.00 ROUTINE HISTORY AND PHYSICAL EXAMINATION OF ADULT: Primary | ICD-10-CM

## 2020-11-20 PROCEDURE — 99396 PREV VISIT EST AGE 40-64: CPT | Performed by: FAMILY MEDICINE

## 2020-11-20 ASSESSMENT — PAIN SCALES - GENERAL: PAINLEVEL: NO PAIN (0)

## 2020-11-20 NOTE — NURSING NOTE
Chief Complaint   Patient presents with     Physical     Pt would like physical exam today      KARTIK Thompson at 9:44 AM sign on 11/20/2020

## 2020-11-20 NOTE — PROGRESS NOTES
SUBJECTIVE:    Pt is a 40 year old male with pmh of     Patient Active Problem List   Diagnosis     Hydronephrosis, bilateral     Hearing loss     Imbalance     Patient is Yazidism       who is here for evaluation of had concerns including Physical (Pt would like physical exam today ).    Doing well  Works from home    No interval history    Some cervicalgia, he'll try massage let us know if not better    Mom, dad brother 2 kids alive/well    Does labs (lipid, sugar) at work to get insurance discount, he'll forward results    Diet: working on staying healthy in covid times  Exer: tries to do varied activities      Current Outpatient Medications   Medication Sig Dispense Refill     ibuprofen (ADVIL/MOTRIN) 400 MG tablet Take 400 mg by mouth every 6 hours as needed for moderate pain       penicillin V (VEETID) 500 MG tablet Take 1 tablet (500 mg) by mouth 2 times daily (Patient not taking: Reported on 9/11/2019) 20 tablet 0       Social History     Tobacco Use     Smoking status: Never Smoker     Smokeless tobacco: Never Used   Substance Use Topics     Alcohol use: Yes     Comment: Glass of wine a couple times a week.     Drug use: No       Ten pt ROS completed otherwise negative    OBJECTIVE:  /89 (BP Location: Right arm, Patient Position: Sitting, Cuff Size: Adult Large)   Pulse 94   Wt 78.5 kg (173 lb)   SpO2 97%   BMI 24.27 kg/m    GENERAL APPEARANCE: Alert, no acute distress  EYES: PERRL, EOM normal, conjunctiva and lids normal  HENT: Ears and TMs normal, oral mucosa and posterior oropharynx normal  NECK: No adenopathy,masses or thyromegaly  RESP: lungs clear to auscultation   CV: normal rate, regular rhythm, no murmur or gallop  ABDOMEN: soft, no organomegaly, masses or tenderness   (male): penis, scrotum, testes normal, no hernias  LYMPHATICS: No cervical, supraclavicular or inguinal adenopathy  MS: extremities normal, no peripheral edema  NEURO: Alert, oriented, speech and mentation  normal  PSYCHE: mentation appears normal, affect and mood normal  Right foot one plantar wart    ASSESSMENT/PLAN:    He'll work on diet/exericse in covid w/ winter coming  Forward labs via insurance  Yearly physicals  Wart he'll try otc and duct tape and let me know if not workign      FABIENNE LOPEZ MD

## 2021-03-25 ENCOUNTER — MYC MEDICAL ADVICE (OUTPATIENT)
Dept: FAMILY MEDICINE | Facility: CLINIC | Age: 41
End: 2021-03-25

## 2021-07-20 ENCOUNTER — OFFICE VISIT (OUTPATIENT)
Dept: FAMILY MEDICINE | Facility: CLINIC | Age: 41
End: 2021-07-20
Payer: COMMERCIAL

## 2021-07-20 VITALS
SYSTOLIC BLOOD PRESSURE: 151 MMHG | HEART RATE: 75 BPM | RESPIRATION RATE: 14 BRPM | OXYGEN SATURATION: 97 % | WEIGHT: 175.4 LBS | DIASTOLIC BLOOD PRESSURE: 100 MMHG | HEIGHT: 71 IN | BODY MASS INDEX: 24.56 KG/M2 | TEMPERATURE: 97.3 F

## 2021-07-20 DIAGNOSIS — R03.0 ELEVATED BLOOD PRESSURE READING WITHOUT DIAGNOSIS OF HYPERTENSION: ICD-10-CM

## 2021-07-20 DIAGNOSIS — J01.00 ACUTE NON-RECURRENT MAXILLARY SINUSITIS: ICD-10-CM

## 2021-07-20 DIAGNOSIS — H66.001 NON-RECURRENT ACUTE SUPPURATIVE OTITIS MEDIA OF RIGHT EAR WITHOUT SPONTANEOUS RUPTURE OF TYMPANIC MEMBRANE: Primary | ICD-10-CM

## 2021-07-20 ASSESSMENT — PAIN SCALES - GENERAL: PAINLEVEL: EXTREME PAIN (8)

## 2021-07-20 ASSESSMENT — MIFFLIN-ST. JEOR: SCORE: 1709.35

## 2021-07-20 NOTE — PATIENT INSTRUCTIONS
You can take 600 mg every 6-8 hours as needed with food.    You have been prescribed an antibiotic to treat a bacterial infection. Watch for signs of allergic reaction including swelling around the mouth or eyes and the development of a rash.  If you develop any of these symptoms, stop your medication, take a benadryl (25-50 mg) and give our office a call. Consider probiotic therapy to decrease the possibility of diarrhea associated with antibiotic use.    Follow up if you have any worsening or persistent problems or concerns.

## 2021-07-20 NOTE — PROGRESS NOTES
Assessment & Plan     Non-recurrent acute suppurative otitis media of right ear without spontaneous rupture of tympanic membrane  - amoxicillin-clavulanate (AUGMENTIN) 875-125 MG tablet; Take 1 tablet by mouth 2 times daily for 7 days    Acute non-recurrent maxillary sinusitis--resolving    Patient Instructions   You can take 600 mg every 6-8 hours as needed with food.    You have been prescribed an antibiotic to treat a bacterial infection. Watch for signs of allergic reaction including swelling around the mouth or eyes and the development of a rash.  If you develop any of these symptoms, stop your medication, take a benadryl (25-50 mg) and give our office a call. Consider probiotic therapy to decrease the possibility of diarrhea associated with antibiotic use.    Follow up if you have any worsening or persistent problems or concerns.        (R03.0) Elevated blood pressure reading without diagnosis of hypertension  Comment: Blood pressure elevated today though patient is in a significant amount of pain.  It continued to be high on recheck.  Recommended follow-up with primary care provider in the next 2 weeks.    Eun Plascencia PA-C  [facility]      Kirby Bolden is a 41 year old who presents for the following health issues     HPI     Acute Illness: Patient woke last night with severe right sided ear pain.  He has had a cold for the past 5 to 7 days with significant sinus congestion.  The congestion was improving in the last day or so before this pain occurred.  Family members have been sick as well.  Covid testing was negative.  Patient is otherwise in good health.  He takes no prescription medications.  His primary care provider is Dr. Bonds at the [facility].    Blood pressure notably high today.  Has been high in the clinic previously but he is in this significant pain today.. Patient denies symptoms associated with high blood pressure including blurred vision, headache, chest pain, heart  "palpitations, shortness of breath and pedal edema.    BP Readings from Last 6 Encounters:   07/20/21 (!) 151/100   11/20/20 137/89   11/19/19 138/77   09/11/19 (P) 137/79   07/08/19 (!) 128/91   09/10/18 109/72       Symptoms:  Fever: no  Chills/Sweats: no  Headache (location?): no  Sinus Pressure: YES  Conjunctivitis:  no  Ear Pain: YES: right  Rhinorrhea: YES  Congestion: YES  Sore Throat: Initially had a sore throat which has since resolved.  Cough: no  Wheeze: no  Decreased Appetite: no  Fatigue/Achiness: no  Sick/Strep Exposure: yes his children  Therapies tried and outcome: None      Review of Systems   Constitutional, HEENT, cardiovascular, pulmonary, gi and gu systems are negative, except as otherwise noted.      Objective    BP (!) 136/91   Pulse 75   Temp 97.3  F (36.3  C)   Resp 14   Ht 1.798 m (5' 10.79\")   Wt 79.6 kg (175 lb 6.4 oz)   SpO2 97%   BMI 24.61 kg/m    Body mass index is 24.61 kg/m .  Physical Exam   GENERAL: healthy, alert and no distress  EYES: Eyes grossly normal to inspection, PERRL and conjunctivae and sclerae normal  HENT: Right TM erythematous and bulging with purulent effusion.  Left TM normal-appearing.  Nasal congestion noted no facial tenderness with palpation.  Throat injected without inflammation or exudate  NECK: no adenopathy, no asymmetry, masses, or scars and thyroid normal to palpation  RESP: lungs clear to auscultation - no rales, rhonchi or wheezes  CV: regular rate and rhythm, normal S1 S2, no S3 or S4, no murmur, click or rub, no peripheral edema and peripheral pulses strong          "

## 2021-08-13 ENCOUNTER — MYC MEDICAL ADVICE (OUTPATIENT)
Dept: FAMILY MEDICINE | Facility: CLINIC | Age: 41
End: 2021-08-13

## 2021-08-13 NOTE — LETTER
Yuan Caldwell  [address] [place] [address]  : 1980          To Whom It May Concern:              Yuan Caldwell is a patient of mine and has had COVID-19 and has since recovered. He has also been fully vaccinated for COVID-19.           Sincerely,     Dr. Evaristo Bonds

## 2021-08-17 ENCOUNTER — OFFICE VISIT (OUTPATIENT)
Dept: OTOLARYNGOLOGY | Facility: CLINIC | Age: 41
End: 2021-08-17
Payer: COMMERCIAL

## 2021-08-17 ENCOUNTER — HOSPITAL ENCOUNTER (OUTPATIENT)
Dept: CARDIOLOGY | Facility: CLINIC | Age: 41
Discharge: HOME | End: 2021-08-17
Attending: FAMILY MEDICINE | Admitting: FAMILY MEDICINE
Payer: COMMERCIAL

## 2021-08-17 VITALS — HEIGHT: 71 IN | BODY MASS INDEX: 24.5 KG/M2 | WEIGHT: 175 LBS

## 2021-08-17 DIAGNOSIS — H90.41 SENSORINEURAL HEARING LOSS (SNHL) OF RIGHT EAR WITH UNRESTRICTED HEARING OF LEFT EAR: ICD-10-CM

## 2021-08-17 DIAGNOSIS — H83.3X1 NOISE-INDUCED HEARING LOSS OF RIGHT EAR WITH UNRESTRICTED HEARING OF LEFT EAR: Primary | ICD-10-CM

## 2021-08-17 DIAGNOSIS — R03.0 ELEVATED BLOOD PRESSURE READING WITHOUT DIAGNOSIS OF HYPERTENSION: ICD-10-CM

## 2021-08-17 PROCEDURE — 92504 EAR MICROSCOPY EXAMINATION: CPT | Performed by: REGISTERED NURSE

## 2021-08-17 PROCEDURE — 93788 AMBL BP MNTR W/SW A/R: CPT

## 2021-08-17 PROCEDURE — 93790 AMBL BP MNTR W/SW I&R: CPT | Performed by: INTERNAL MEDICINE

## 2021-08-17 PROCEDURE — 99203 OFFICE O/P NEW LOW 30 MIN: CPT | Mod: 25 | Performed by: REGISTERED NURSE

## 2021-08-17 ASSESSMENT — MIFFLIN-ST. JEOR: SCORE: 1710.92

## 2021-08-17 ASSESSMENT — PAIN SCALES - GENERAL: PAINLEVEL: NO PAIN (0)

## 2021-08-17 NOTE — PROGRESS NOTES
"  Otolaryngology Clinic  August 17, 2021    Chief Complaint:   Right otitis media       History of Present Illness:   Yuan Caldwell is a 41 year old male who presents today for evaluation of right otitis media. Patient was seen in his Primary Care Clinic on 7/20/21 due to acute onset of severe right otalgia and otorrhea. He was found to have otitis media of the right ear without a rupture and acute maxillary sinusitis. He was placed on a 7 day course of Augmentin. Patient reported improvement of right otalgia and sinus pressure pain. Continued to have decrease hearing on the right. This has slowly improved over the past couple of weeks. Now has crackling sound and ear popping on the right side when blowing his nose. Reports that hearing is almost to baseline. Patient is flying in about a month and is concerned that he will have increased ear pain due to recent infection.     Patient has been seen in the Otolaryngology clinic in the past by Dr. Perales for vertigo and right sensorineural hearing loss. Patient states that he has not had any frequent dizziness episodes. The last episode was about 2 months ago and was brief, lasting 10-15 seconds. Right ear hearing seems to be stable, however, he has not had an audiogram since 2013.    Patient does have a history of frequent ear infections as a child, no history of any ear surgeries.     Past Medical History:  Past Medical History:   Diagnosis Date     Hydronephrosis, bilateral      Past Surgical History:  Past Surgical History:   Procedure Laterality Date     PYELOPLASTY       Medications:  Current Outpatient Medications   Medication Sig Dispense Refill     ibuprofen (ADVIL/MOTRIN) 400 MG tablet Take 400 mg by mouth every 6 hours as needed for moderate pain       Allergies:  Allergies   Allergen Reactions     Nkda [No Known Drug Allergies]      Seasonal Allergies      \" congested sinuses\"      Social History:  Social History     Tobacco Use     Smoking status: Never " "Smoker     Smokeless tobacco: Never Used   Substance Use Topics     Alcohol use: Yes     Comment: Glass of wine a couple times a week.     Drug use: No       ROS: 10 point ROS neg other than the symptoms noted above in the HPI.    Physical Exam:    Ht 1.803 m (5' 11\")   Wt 79.4 kg (175 lb)   BMI 24.41 kg/m       Constitutional:  The patient was unaccompanied, well-groomed, and in no acute distress.     Neurologic: Alert and oriented x 3.    Psychiatric: The patient's affect was calm, cooperative, and appropriate.     Communication:  Normal; communicates verbally, normal voice quality.    Respiratory: Breathing comfortably without stridor or exertion of accessory muscles.    Ears: Pinnae and tragus non-tender.  Keene: midline. Rinne: A>B bilaterally.      Otologic microscope exam:    Patient's ear pathology required use of the binocular microscope for the purpose of cleaning and improving visualization in order to assure a more accurate diagnostic evaluation.    Right ear was examined under the microscope.  Normal appearing TM, nicely aerated middle ear space. There is no fluid or perforation.     Left ear was also examined under the microscope.  Normal appearing TM, nicely aerated middle ear space.         Audiogram: 5/22/2013- data independently reviewed  Right ear: Borderline normal sloping to severe SNHL at 8kHz   Left ear: Normal hearing   WR right: 100% @ 60 dB left: 100% @ 50 dB  Acoustic Reflexes: present in all conditions at 1K  Tympanograms: type A right, type A left        Assessment and Plan:  1. History of acute otitis media of right ear  Patient with history of acute otitis media of the right ear that was successfully treated with oral abx. There is no evidence of middle ear effusion on today's microscope exam. Keene is midline. Discussed with patient that middle ear infections do take time to heal and hearing may not return to baseline for up to 3 months after infection. Patient is concerned about " ear pain with flying. Discussed using Afrin and ear plugs during flight to prevent negative pressure in the ear.     2. Sensorineural hearing loss (SNHL) of right ear with unrestricted hearing of left ear  Patient with history of SNHL of the right ear. Has not had an audiogram since 2013. Recommend repeat hearing test for ongoing monitoring. Patient may find benefit with amplification but may not want to trial aid until he is around more crowded environments again.      Patient will follow up as needed for any new hearing loss or ear symptoms.     Candie Teixeira DNP, APRN, CNP  Otolaryngology  Head & Neck Surgery  [phone]    30 minutes spent on the date of the encounter doing chart review, history and exam, documentation and further activities per the note

## 2021-08-17 NOTE — LETTER
8/17/2021       RE: Yuan Caldwell  [address] [place] [facility] [address]     Dear Colleague,    Thank you for referring your patient, Yuan Caldwell, to the [facility] EAR NOSE AND THROAT CLINIC [place] at [facility]. Please see a copy of my visit note below.      Otolaryngology Clinic  August 17, 2021    Chief Complaint:   Right otitis media       History of Present Illness:   Yuan Caldwell is a 41 year old male who presents today for evaluation of right otitis media. Patient was seen in his Primary Care Clinic on 7/20/21 due to acute onset of severe right otalgia and otorrhea. He was found to have otitis media of the right ear without a rupture and acute maxillary sinusitis. He was placed on a 7 day course of Augmentin. Patient reported improvement of right otalgia and sinus pressure pain. Continued to have decrease hearing on the right. This has slowly improved over the past couple of weeks. Now has crackling sound and ear popping on the right side when blowing his nose. Reports that hearing is almost to baseline. Patient is flying in about a month and is concerned that he will have increased ear pain due to recent infection.     Patient has been seen in the Otolaryngology clinic in the past by Dr. Perales for vertigo and right sensorineural hearing loss. Patient states that he has not had any frequent dizziness episodes. The last episode was about 2 months ago and was brief, lasting 10-15 seconds. Right ear hearing seems to be stable, however, he has not had an audiogram since 2013.    Patient does have a history of frequent ear infections as a child, no history of any ear surgeries.     Past Medical History:  Past Medical History:   Diagnosis Date     Hydronephrosis, bilateral      Past Surgical History:  Past Surgical History:   Procedure Laterality Date     PYELOPLASTY       Medications:  Current Outpatient Medications   Medication Sig Dispense  "Refill     ibuprofen (ADVIL/MOTRIN) 400 MG tablet Take 400 mg by mouth every 6 hours as needed for moderate pain       Allergies:  Allergies   Allergen Reactions     Nkda [No Known Drug Allergies]      Seasonal Allergies      \" congested sinuses\"      Social History:  Social History     Tobacco Use     Smoking status: Never Smoker     Smokeless tobacco: Never Used   Substance Use Topics     Alcohol use: Yes     Comment: Glass of wine a couple times a week.     Drug use: No       ROS: 10 point ROS neg other than the symptoms noted above in the HPI.    Physical Exam:    Ht 1.803 m (5' 11\")   Wt 79.4 kg (175 lb)   BMI 24.41 kg/m       Constitutional:  The patient was unaccompanied, well-groomed, and in no acute distress.     Neurologic: Alert and oriented x 3.    Psychiatric: The patient's affect was calm, cooperative, and appropriate.     Communication:  Normal; communicates verbally, normal voice quality.    Respiratory: Breathing comfortably without stridor or exertion of accessory muscles.    Ears: Pinnae and tragus non-tender.  Keene: midline. Rinne: A>B bilaterally.      Otologic microscope exam:    Patient's ear pathology required use of the binocular microscope for the purpose of cleaning and improving visualization in order to assure a more accurate diagnostic evaluation.    Right ear was examined under the microscope.  Normal appearing TM, nicely aerated middle ear space. There is no fluid or perforation.     Left ear was also examined under the microscope.  Normal appearing TM, nicely aerated middle ear space.         Audiogram: 5/22/2013- data independently reviewed  Right ear: Borderline normal sloping to severe SNHL at 8kHz   Left ear: Normal hearing   WR right: 100% @ 60 dB left: 100% @ 50 dB  Acoustic Reflexes: present in all conditions at 1K  Tympanograms: type A right, type A left        Assessment and Plan:  1. History of acute otitis media of right ear  Patient with history of acute otitis media " of the right ear that was successfully treated with oral abx. There is no evidence of middle ear effusion on today's microscope exam. Keene is midline. Discussed with patient that middle ear infections do take time to heal and hearing may not return to baseline for up to 3 months after infection. Patient is concerned about ear pain with flying. Discussed using Afrin and ear plugs during flight to prevent negative pressure in the ear.     2. Sensorineural hearing loss (SNHL) of right ear with unrestricted hearing of left ear  Patient with history of SNHL of the right ear. Has not had an audiogram since 2013. Recommend repeat hearing test for ongoing monitoring. Patient may find benefit with amplification but may not want to trial aid until he is around more crowded environments again.      Patient will follow up as needed for any new hearing loss or ear symptoms.     Candie Teixeira DNP, APRN, CNP  Otolaryngology  Head & Neck Surgery  [phone]    30 minutes spent on the date of the encounter doing chart review, history and exam, documentation and further activities per the note

## 2021-08-17 NOTE — NURSING NOTE
"Chief Complaint   Patient presents with     RECHECK     ear infection follow up       Height 1.803 m (5' 11\"), weight 79.4 kg (175 lb).    Fay Riggins, EMT    "

## 2021-08-17 NOTE — PATIENT INSTRUCTIONS
- You were seen in the ENT Clinic today by Candie Teixeira NP.   - Please schedule audiogram at your convenience.  - You can use Afrin nasal spray every 12 hours before flying. Do not use more than 3 days due to risk of rebound nasal congestion.  - You can also use ear plugs when flying to prevent ear pain and fullness.  - Please send a [employer] message or call the ENT clinic at [phone] with any questions or concerns.

## 2021-08-18 ENCOUNTER — OFFICE VISIT (OUTPATIENT)
Dept: AUDIOLOGY | Facility: CLINIC | Age: 41
End: 2021-08-18
Payer: COMMERCIAL

## 2021-08-18 DIAGNOSIS — H90.41 SENSORINEURAL HEARING LOSS (SNHL) OF RIGHT EAR WITH UNRESTRICTED HEARING OF LEFT EAR: ICD-10-CM

## 2021-08-18 DIAGNOSIS — H90.71 MIXED CONDUCTIVE AND SENSORINEURAL HEARING LOSS OF RIGHT EAR WITH UNRESTRICTED HEARING OF LEFT EAR: ICD-10-CM

## 2021-08-18 PROCEDURE — 92557 COMPREHENSIVE HEARING TEST: CPT | Performed by: AUDIOLOGIST

## 2021-08-18 PROCEDURE — 92550 TYMPANOMETRY & REFLEX THRESH: CPT | Performed by: AUDIOLOGIST

## 2021-08-18 PROCEDURE — 92565 STENGER TEST PURE TONE: CPT | Performed by: AUDIOLOGIST

## 2021-08-18 NOTE — PROGRESS NOTES
AUDIOLOGY REPORT    SUMMARY: Audiology visit completed. See audiogram for results.      RECOMMENDATIONS: Follow-up with ENT.    Jessica Azar. CCC-A  Licensed Audiologist   [place] #[id number]

## 2021-08-24 ENCOUNTER — MYC MEDICAL ADVICE (OUTPATIENT)
Dept: FAMILY MEDICINE | Facility: CLINIC | Age: 41
End: 2021-08-24

## 2021-09-25 ENCOUNTER — HEALTH MAINTENANCE LETTER (OUTPATIENT)
Age: 41
End: 2021-09-25

## 2021-12-01 ENCOUNTER — OFFICE VISIT (OUTPATIENT)
Dept: FAMILY MEDICINE | Facility: CLINIC | Age: 41
End: 2021-12-01
Payer: COMMERCIAL

## 2021-12-01 VITALS
OXYGEN SATURATION: 98 % | DIASTOLIC BLOOD PRESSURE: 81 MMHG | WEIGHT: 168 LBS | BODY MASS INDEX: 23.52 KG/M2 | HEART RATE: 83 BPM | RESPIRATION RATE: 16 BRPM | HEIGHT: 71 IN | SYSTOLIC BLOOD PRESSURE: 125 MMHG

## 2021-12-01 DIAGNOSIS — H91.90 HEARING LOSS, UNSPECIFIED HEARING LOSS TYPE, UNSPECIFIED LATERALITY: Primary | ICD-10-CM

## 2021-12-01 PROCEDURE — 99396 PREV VISIT EST AGE 40-64: CPT | Performed by: FAMILY MEDICINE

## 2021-12-01 ASSESSMENT — PAIN SCALES - GENERAL: PAINLEVEL: NO PAIN (0)

## 2021-12-01 ASSESSMENT — MIFFLIN-ST. JEOR: SCORE: 1679.17

## 2021-12-01 NOTE — NURSING NOTE
HPI Comments:   Here for suture removal  Placed 8 or 9 days ago. Had 8 inside lower lip and 8 on chin. The 8 on chin \"fell out\" but wound is healed without concern. Denies pain, fever, discharge. Healing well. No pain. Patient is a 19 y.o. male presenting with suture removal.   Suture Removal          Past Medical History:   Diagnosis Date    ADD (attention deficit disorder)     Asthma     allergy induced    H/O seasonal allergies     Nausea & vomiting         Past Surgical History:   Procedure Laterality Date    HX GI      bell clapper deformity    HX TONSIL AND ADENOIDECTOMY      HX TYMPANOSTOMY      HX TYMPANOSTOMY           Family History   Problem Relation Age of Onset    Hypertension Mother     Asthma Father     Other Brother      pituitaty adenoma    Anesth Problems Neg Hx         Social History     Social History    Marital status: SINGLE     Spouse name: N/A    Number of children: N/A    Years of education: N/A     Occupational History    Not on file. Social History Main Topics    Smoking status: Current Every Day Smoker     Packs/day: 0.25    Smokeless tobacco: Former User    Alcohol use Yes      Comment: beer unknown amt on weekends    Drug use: No    Sexual activity: No     Other Topics Concern    Not on file     Social History Narrative                ALLERGIES: Penicillins    Review of Systems    Vitals:    07/05/18 1715 07/05/18 1718   BP:  138/63   Pulse:  71   Resp:  18   Temp:  97.8 °F (36.6 °C)   SpO2:  98%   Weight: 210 lb (95.3 kg) 210 lb (95.3 kg)   Height: 6' 1\" (1.854 m) 6' 1\" (1.854 m)       Physical Exam   Constitutional: No distress. HENT:   Mouth/Throat: Oropharynx is clear and moist.   Eyes: Conjunctivae and EOM are normal. Pupils are equal, round, and reactive to light. Pulmonary/Chest: No respiratory distress. Skin: He is not diaphoretic. Wound chin and on lower lip healed well.  Edges well accommodated post suture removal. No bleeding, Yuan Caldwell is a 41 year old male patient that presents today in clinic for the following:    Chief Complaint   Patient presents with     Physical     Patient comes for annual physical.      The patient's allergies and medications were reviewed as noted. A set of vitals were recorded as noted without incident. The patient does not have any other questions for the provider.    Santiago Keen, EMT at 8:25 AM on 12/1/2021   swelling or discharge. Psychiatric: He has a normal mood and affect. His behavior is normal.       MDM     Differential Diagnosis; Clinical Impression; Plan:       CLINICAL IMPRESSION:  (Z48.02) Visit for suture removal  (primary encounter diagnosis)      Plan:  1. Tolerated well. Wound well healed without evidence of complication  2. Keep clean. 3. Return to clinic immediately or follow up with PCP for any new symptoms. Suture removal:  Area cleansed with alcohol prep pad. Sterile foreceps and scissors used to remove sutures. 8 total removed from lip. Patient tolerated procedure well without any noted complication.         Procedures

## 2021-12-01 NOTE — PROGRESS NOTES
"  Assessment & Plan     Hearing loss, unspecified hearing loss type, unspecified laterality  Due  - Otolaryngology Referral    Finger injury: improving f/u prn Sp Med sprain potentially small fx but full no pain ROM now    Thigh mass: likely cyst vs old injury residual monitor, if bigger or hurts let me know          36 minutes spent on the date of the encounter doing chart review, history and exam, documentation and further activities per the note    Evaristo Bonds MD  [facility] ERNIE Bolden is a 41 year old who presents for the following health issues    HPI For a few mo notes right ant mid thigh subdermal bump no tender or draining noted by chance. No change.  No trauma recalled    Also had fallen off ladder in summer since left third finger PIP sore, puffy, now pain gone moves normally still a bit enlarged.    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     No current facility-administered medications for this visit.     Allergies   Allergen Reactions     Nkda [No Known Drug Allergies]      Seasonal Allergies      \" congested sinuses\"                 Review of Systems         Objective    /81 (BP Location: Right arm, Patient Position: Sitting, Cuff Size: Adult Regular)   Pulse 83   Resp 16   Ht 1.803 m (5' 11\")   Wt 76.2 kg (168 lb)   SpO2 98%   BMI 23.43 kg/m    Body mass index is 23.43 kg/m .  Physical Exam   GENERAL: healthy, alert and no distress  EYES: Eyes grossly normal to inspection, " PERRL and conjunctivae and sclerae normal  HENT: ear canals and TM's normal, nose and mouth without ulcers or lesions  NECK: no adenopathy, no asymmetry, masses, or scars and thyroid normal to palpation  RESP: lungs clear to auscultation - no rales, rhonchi or wheezes  CV: regular rate and rhythm, normal S1 S2, no S3 or S4, no murmur, click or rub, no peripheral edema and peripheral pulses strong  ABDOMEN: soft, nontender, no hepatosplenomegaly, no masses and bowel sounds normal   (male): normal male genitalia without lesions or urethral discharge, no hernia  MS: no gross musculoskeletal defects noted, no edema  SKIN: no suspicious lesions or rashes  NEURO: Normal strength and tone, mentation intact and speech normal  PSYCH: mentation appears normal, affect normal/bright  LYMPH: no cervical, supraclavicular, axillary, or inguinal adenopathy    Left third finger slightly thickened PIP full ROM not red warm tender  Left mid thigh anterior one cm mobile firm not hard nontender subdermal mass skin wnl

## 2021-12-02 NOTE — TELEPHONE ENCOUNTER
FUTURE VISIT INFORMATION      FUTURE VISIT INFORMATION:    Date: 2/15/2022    Time: 9:30AM    [place] [facility] [place]  REFERRAL INFORMATION:    Referring provider:  Evaristo Bonds MD    Referring providers clinic:  [facility] [place]     Reason for visit/diagnosis  Hearing loss, unspecified hearing loss type - sched per pt    RECORDS REQUESTED FROM:       Clinic name Comments Records Status Imaging Status   [facility]  12/1/2021 note and referral from Evaristo Bonds MD [facility] [place] 8/18/2021 audiogram   8/17/2021 note from Tangela Teixeira NP   EPIC

## 2022-02-14 NOTE — PATIENT INSTRUCTIONS
1. You were seen in the ENT Clinic today by Dr. Nissen.  If you have any questions or concerns after your appointment, please call   - Option 1: ENT Clinic: [phone]   - Option 2: Sara (Dr. Nissen's Nurse): [phone]                   Allie(Dr. Nissen's Nurse): [phone]      2.   Plan to return to clinic in 2 years with hearing test    Sara Dillon LPN  [facility] - Otolaryngology

## 2022-02-15 ENCOUNTER — OFFICE VISIT (OUTPATIENT)
Dept: OTOLARYNGOLOGY | Facility: CLINIC | Age: 42
End: 2022-02-15
Attending: FAMILY MEDICINE
Payer: COMMERCIAL

## 2022-02-15 ENCOUNTER — OFFICE VISIT (OUTPATIENT)
Dept: AUDIOLOGY | Facility: CLINIC | Age: 42
End: 2022-02-15
Attending: FAMILY MEDICINE
Payer: COMMERCIAL

## 2022-02-15 ENCOUNTER — PRE VISIT (OUTPATIENT)
Dept: OTOLARYNGOLOGY | Facility: CLINIC | Age: 42
End: 2022-02-15

## 2022-02-15 VITALS
SYSTOLIC BLOOD PRESSURE: 138 MMHG | HEART RATE: 78 BPM | DIASTOLIC BLOOD PRESSURE: 93 MMHG | HEIGHT: 71 IN | OXYGEN SATURATION: 96 % | BODY MASS INDEX: 23.52 KG/M2 | WEIGHT: 168 LBS | TEMPERATURE: 98.2 F

## 2022-02-15 DIAGNOSIS — H90.41 SENSORINEURAL HEARING LOSS (SNHL) OF RIGHT EAR WITH UNRESTRICTED HEARING OF LEFT EAR: Primary | ICD-10-CM

## 2022-02-15 DIAGNOSIS — R42 DIZZINESS: ICD-10-CM

## 2022-02-15 DIAGNOSIS — H61.23 EXCESSIVE CERUMEN IN BOTH EAR CANALS: ICD-10-CM

## 2022-02-15 DIAGNOSIS — H90.3 ASYMMETRICAL SENSORINEURAL HEARING LOSS: Primary | ICD-10-CM

## 2022-02-15 DIAGNOSIS — H93.11 TINNITUS, RIGHT: ICD-10-CM

## 2022-02-15 PROCEDURE — 99203 OFFICE O/P NEW LOW 30 MIN: CPT | Mod: 25 | Performed by: OTOLARYNGOLOGY

## 2022-02-15 PROCEDURE — 92557 COMPREHENSIVE HEARING TEST: CPT | Performed by: AUDIOLOGIST

## 2022-02-15 PROCEDURE — 92550 TYMPANOMETRY & REFLEX THRESH: CPT | Performed by: AUDIOLOGIST

## 2022-02-15 PROCEDURE — 92504 EAR MICROSCOPY EXAMINATION: CPT | Performed by: OTOLARYNGOLOGY

## 2022-02-15 ASSESSMENT — PAIN SCALES - GENERAL: PAINLEVEL: NO PAIN (0)

## 2022-02-15 ASSESSMENT — MIFFLIN-ST. JEOR: SCORE: 1679.17

## 2022-02-15 NOTE — NURSING NOTE
"Chief Complaint   Patient presents with     Consult     hearing loss, unspecified type    Blood pressure (!) 138/93, pulse 78, temperature 98.2  F (36.8  C), temperature source Temporal, height 1.803 m (5' 11\"), weight 76.2 kg (168 lb), SpO2 96 %. Ledy Martinez, EMT  "

## 2022-02-15 NOTE — PROGRESS NOTES
AUDIOLOGY REPORT    SUMMARY: Audiology visit completed. See audiogram for results.      RECOMMENDATIONS: Follow-up with ENT.      Jessica Velásquez.  Licensed Audiologist  [place] #[id number]

## 2022-02-15 NOTE — PROGRESS NOTES
"Dear Evaristo Calderon:    I had the pleasure of seeing Yuan Caldwell in followup today at the [facility] Otolaryngology [facility].    CHIEF COMPLAINT: Right asymmetrical sensorineural hearing loss    HISTORY OF PRESENT ILLNESS: Patient is a 41-year-old in today for follow-up on his ears and right sensorineural hearing loss, last seen in 2021.  He had a longstanding right asymmetrical sensorineural hearing loss, last seen in 2013 by Dr. Perales.  Initially was seen in 2007 and an MRI scan at that time was negative.  He was lost to follow-up and was seen last year in clinic, did have a mild conductive loss in the right ear which was treated appropriately.  That did clear.  On his follow-up today, he comes in now as he is not had regular follow-ups and he just wanted to establish care again.  He feels hearing in the right ear is back to its usual level.  He has had no pain or drainage.  Does have occasional tinnitus but not problematic.  Occasional dizziness he describes as an off-balance.  Again not problematic.  Denies any pain or drainage.  There is no dysphagia, hoarseness, facial paresthesias.    MEDICATIONS: Please refer to the detailed medication reconciliation performed by my nurse today, which I have reviewed and signed.     ALLERGIES:    Allergies   Allergen Reactions     Nkda [No Known Drug Allergies]      Seasonal Allergies      \" congested sinuses\"       HABITS: Social History    Substance and Sexual Activity      Alcohol use: Yes        Comment: Glass of wine a couple times a week.     History   Smoking Status     Never Smoker   Smokeless Tobacco     Never Used         PAST MEDICAL HISTORY:  Please see today's intake form (for the remainder of the PMH) which I reviewed and signed.  Past Medical History:   Diagnosis Date     Hydronephrosis, bilateral        FAMILY HISTORY/SOCIAL HISTORY:    Family History   Problem Relation Age of Onset     Cancer Maternal Grandfather      Cancer Paternal " Grandmother       Social History     Socioeconomic History     Marital status:      Spouse name: Not on file     Number of children: Not on file     Years of education: Not on file     Highest education level: Not on file   Occupational History     Not on file   Tobacco Use     Smoking status: Never Smoker     Smokeless tobacco: Never Used   Substance and Sexual Activity     Alcohol use: Yes     Comment: Glass of wine a couple times a week.     Drug use: No     Sexual activity: Yes     Partners: Female     Birth control/protection: Condom   Other Topics Concern     Parent/sibling w/ CABG, MI or angioplasty before 65F 55M? Not Asked   Social History Narrative    He does IT work, programming, for the [employer]     Social Determinants of Health     Financial Resource Strain: Not on file   Food Insecurity: Not on file   Transportation Needs: Not on file   Physical Activity: Not on file   Stress: Not on file   Social Connections: Not on file   Intimate Partner Violence: Not on file   Housing Stability: Not on file       REVIEW OF SYSTEMS: Patient Supplied Answers to Review of Systems   ENT ROS 2/9/2022   Constitutional -   Neurology Dizzy spells   Ears, Nose, Throat Hearing loss   Cardiopulmonary -   Musculoskeletal -   Other Problems with anesthesia in surgery            The remainder of the 10 point ROS is negative    PHYSICIAL EXAMINATION:  Constitutional: The patient was well-groomed and in no acute distress.   Skin: Warm and pink.  Psychiatric: The patient's affect was calm, cooperative, and appropriate.   Respiratory: Breathing comfortably without stridor or exertion of accessory muscles.  Eyes: Pupils were equal and reactive. Extraocular movement intact.   Head: Normocephalic and atraumatic. No lesions or scars.  Ears: Patient placed under the microscope for microscopic evaluation and cleaning of cerumen which was obscuring full visualization and complete assessment of both TMs. Under high  power magnification, the right ear was examined and cleaned of cerumen using curet, alligator forceps, and suction.  After cleaning, TM is fully visualized and has normal position with normal middle ear aeration. The left ear was then cleaned and inspected using microscope, instruments and similar techniques. After cleaning of cerumen, the TM has normal position with normal aeration to middle ear.  Nose: Sinuses were nontender. Anterior rhinoscopy revealed midline septum and absence of purulence or polyps.  Oral Cavity: Normal tongue, floor of mouth, buccal mucosa, and palate. No abnormal lymph tissue in the oropharynx.   Neck: The parotid is soft without masses. Supple with normal laryngeal and tracheal landmarks.   Lymphatic: There is no palpable lymphadenopathy or other masses in the neck.   Neurologic: Alert and oriented x 3. Cranial nerves III-XI within normal limits. Voice quality normal.  Cerebellar Function Tests:  Grossly normal    Audiogram: Audiogram performed shows normal hearing in the left ear.  The right ear shows a mild sensory hearing loss through speech frequencies with a moderate downsloping high-frequency sensorineural hearing loss.  Excellent discrimination at 100% in each ear.  Normal type A tympanograms bilaterally.  Tuning forks are normal.    IMPRESSION AND PLAN:   1. Right asymmetrical sensorineural hearing loss: This has been essentially stable for some time.  He did have an MRI scan in 2007 which was negative.  Discussed with him option for hearing aids if problematic to the right ear.  Otherwise I would just monitor, he will pursue that at his discretion place location.  Since been stable for some time, recommend he come in in 2 years, sooner with any increased concerns or problems.  2. Right tinnitus: Not problematic, no treatment needed, monitor.  3. Dizziness: Mild, sounds to be postural hypotension, no treatment needed, monitor.  4. Excessive cerumen: Cleaned today, for treatment  needed, monitor.    Thank you very much for the opportunity to participate in the care of your patient.    Rick L Nissen MD

## 2022-02-15 NOTE — LETTER
"2/15/2022       RE: Yuan Caldwell  [address] [place] [address]     Dear Colleague,    Thank you for referring your patient, Yuan Caldwell, to the [facility] [place] [facility]. Please see a copy of my visit note below.    Dear Evaristo Calderon:    I had the pleasure of seeing Yuan Caldwell in followup today at the [facility].    CHIEF COMPLAINT: Right asymmetrical sensorineural hearing loss    HISTORY OF PRESENT ILLNESS: Patient is a 41-year-old in today for follow-up on his ears and right sensorineural hearing loss, last seen in 2021.  He had a longstanding right asymmetrical sensorineural hearing loss, last seen in 2013 by Dr. Perales.  Initially was seen in 2007 and an MRI scan at that time was negative.  He was lost to follow-up and was seen last year in clinic, did have a mild conductive loss in the right ear which was treated appropriately.  That did clear.  On his follow-up today, he comes in now as he is not had regular follow-ups and he just wanted to establish care again.  He feels hearing in the right ear is back to its usual level.  He has had no pain or drainage.  Does have occasional tinnitus but not problematic.  Occasional dizziness he describes as an off-balance.  Again not problematic.  Denies any pain or drainage.  There is no dysphagia, hoarseness, facial paresthesias.    MEDICATIONS: Please refer to the detailed medication reconciliation performed by my nurse today, which I have reviewed and signed.     ALLERGIES:    Allergies   Allergen Reactions     Nkda [No Known Drug Allergies]      Seasonal Allergies      \" congested sinuses\"       HABITS: Social History    Substance and Sexual Activity      Alcohol use: Yes        Comment: Glass of wine a couple times a week.     History   Smoking Status     Never Smoker   Smokeless Tobacco     Never Used     "     PAST MEDICAL HISTORY:  Please see today's intake form (for the remainder of the PMH) which I reviewed and signed.  Past Medical History:   Diagnosis Date     Hydronephrosis, bilateral        FAMILY HISTORY/SOCIAL HISTORY:    Family History   Problem Relation Age of Onset     Cancer Maternal Grandfather      Cancer Paternal Grandmother       Social History     Socioeconomic History     Marital status:      Spouse name: Not on file     Number of children: Not on file     Years of education: Not on file     Highest education level: Not on file   Occupational History     Not on file   Tobacco Use     Smoking status: Never Smoker     Smokeless tobacco: Never Used   Substance and Sexual Activity     Alcohol use: Yes     Comment: Glass of wine a couple times a week.     Drug use: No     Sexual activity: Yes     Partners: Female     Birth control/protection: Condom   Other Topics Concern     Parent/sibling w/ CABG, MI or angioplasty before 65F 55M? Not Asked   Social History Narrative    He does IT work, programming, for the [employer]     Social Determinants of Health     Financial Resource Strain: Not on file   Food Insecurity: Not on file   Transportation Needs: Not on file   Physical Activity: Not on file   Stress: Not on file   Social Connections: Not on file   Intimate Partner Violence: Not on file   Housing Stability: Not on file       REVIEW OF SYSTEMS: Patient Supplied Answers to Review of Systems   ENT ROS 2/9/2022   Constitutional -   Neurology Dizzy spells   Ears, Nose, Throat Hearing loss   Cardiopulmonary -   Musculoskeletal -   Other Problems with anesthesia in surgery            The remainder of the 10 point ROS is negative    PHYSICIAL EXAMINATION:  Constitutional: The patient was well-groomed and in no acute distress.   Skin: Warm and pink.  Psychiatric: The patient's affect was calm, cooperative, and appropriate.   Respiratory: Breathing comfortably without stridor or exertion of  accessory muscles.  Eyes: Pupils were equal and reactive. Extraocular movement intact.   Head: Normocephalic and atraumatic. No lesions or scars.  Ears: Patient placed under the microscope for microscopic evaluation and cleaning of cerumen which was obscuring full visualization and complete assessment of both TMs. Under high power magnification, the right ear was examined and cleaned of cerumen using curet, alligator forceps, and suction.  After cleaning, TM is fully visualized and has normal position with normal middle ear aeration. The left ear was then cleaned and inspected using microscope, instruments and similar techniques. After cleaning of cerumen, the TM has normal position with normal aeration to middle ear.  Nose: Sinuses were nontender. Anterior rhinoscopy revealed midline septum and absence of purulence or polyps.  Oral Cavity: Normal tongue, floor of mouth, buccal mucosa, and palate. No abnormal lymph tissue in the oropharynx.   Neck: The parotid is soft without masses. Supple with normal laryngeal and tracheal landmarks.   Lymphatic: There is no palpable lymphadenopathy or other masses in the neck.   Neurologic: Alert and oriented x 3. Cranial nerves III-XI within normal limits. Voice quality normal.  Cerebellar Function Tests:  Grossly normal    Audiogram: Audiogram performed shows normal hearing in the left ear.  The right ear shows a mild sensory hearing loss through speech frequencies with a moderate downsloping high-frequency sensorineural hearing loss.  Excellent discrimination at 100% in each ear.  Normal type A tympanograms bilaterally.  Tuning forks are normal.    IMPRESSION AND PLAN:   1. Right asymmetrical sensorineural hearing loss: This has been essentially stable for some time.  He did have an MRI scan in 2007 which was negative.  Discussed with him option for hearing aids if problematic to the right ear.  Otherwise I would just monitor, he will pursue that at his discretion place  location.  Since been stable for some time, recommend he come in in 2 years, sooner with any increased concerns or problems.  2. Right tinnitus: Not problematic, no treatment needed, monitor.  3. Dizziness: Mild, sounds to be postural hypotension, no treatment needed, monitor.  4. Excessive cerumen: Cleaned today, for treatment needed, monitor.    Thank you very much for the opportunity to participate in the care of your patient.    Rick L Nissen MD

## 2022-10-04 PROBLEM — Z78.9 OTHER SPECIFIED HEALTH STATUS: Chronic | Status: ACTIVE | Noted: 2019-09-16

## 2022-11-16 ENCOUNTER — OFFICE VISIT (OUTPATIENT)
Dept: FAMILY MEDICINE | Facility: CLINIC | Age: 42
End: 2022-11-16
Payer: COMMERCIAL

## 2022-11-16 VITALS
DIASTOLIC BLOOD PRESSURE: 87 MMHG | BODY MASS INDEX: 24.3 KG/M2 | HEIGHT: 71 IN | OXYGEN SATURATION: 97 % | WEIGHT: 173.6 LBS | SYSTOLIC BLOOD PRESSURE: 134 MMHG | HEART RATE: 77 BPM

## 2022-11-16 DIAGNOSIS — Z00.00 ROUTINE HISTORY AND PHYSICAL EXAMINATION OF ADULT: Primary | ICD-10-CM

## 2022-11-16 PROCEDURE — 99396 PREV VISIT EST AGE 40-64: CPT | Performed by: FAMILY MEDICINE

## 2022-11-16 NOTE — PROGRESS NOTES
"  Assessment & Plan     Routine history and physical examination of adult  Cont healthy kaushal. He'll update shots soon, send us copy outside labs too. See me one year         31 minutes spent on the date of the encounter doing chart review, history and exam, documentation and further activities per the note    Evaristo Bonds MD  [facility] ERNIE Bolden is a 42 year old, presenting for the following health issues:  Physical (Pt here for physical. No concerns stated. 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     No current facility-administered medications for this visit.     Allergies   Allergen Reactions     Nkda [No Known Drug Allergies]      Seasonal Allergies      \" congested sinuses\"     Does a variety of activities for exercise  Family History   Problem Relation Age of Onset     Cancer Maternal Grandfather      Cancer Paternal Grandmother      Social History     Socioeconomic History     Marital status:      Spouse name: Not on file     Number of children: Not on file     Years of education: Not on file     Highest education level: Not on file   Occupational History     Not on file   Tobacco Use     Smoking status: Never     Smokeless tobacco: Never   Substance and Sexual Activity     Alcohol use: Yes     Comment: Glass of wine a couple times a week.     Drug use: No     Sexual activity: Yes     Partners: Female     Birth control/protection: Condom   Other Topics Concern     Parent/sibling w/ CABG, MI or angioplasty before 65F 55M? Not " "Asked   Social History Narrative    He does IT work, programming, for the [employer]     Social Determinants of Health     Financial Resource Strain: Not on file   Food Insecurity: Not on file   Transportation Needs: Not on file   Physical Activity: Not on file   Stress: Not on file   Social Connections: Not on file   Intimate Partner Violence: Not on file   Housing Stability: Not on file           Review of Systems   Ten point ROS negative, last year finger pain and thigh lump resolved on their own      Objective    /87 (BP Location: Right arm, Patient Position: Sitting, Cuff Size: Adult Regular)   Pulse 77   Ht 1.803 m (5' 11\")   Wt 78.7 kg (173 lb 9.6 oz)   SpO2 97%   BMI 24.21 kg/m    Body mass index is 24.21 kg/m .  Physical Exam   GENERAL: healthy, alert and no distress  EYES: Eyes grossly normal to inspection, PERRL and conjunctivae and sclerae normal  HENT: ear canals and TM's normal, nose and mouth without ulcers or lesions  NECK: no adenopathy, no asymmetry, masses, or scars and thyroid normal to palpation  RESP: lungs clear to auscultation - no rales, rhonchi or wheezes  CV: regular rate and rhythm, normal S1 S2, no S3 or S4, no murmur, click or rub, no peripheral edema and peripheral pulses strong  ABDOMEN: soft, nontender, no hepatosplenomegaly, no masses and bowel sounds normal   (male): normal male genitalia without lesions or urethral discharge, no hernia  MS: no gross musculoskeletal defects noted, no edema  SKIN: no suspicious lesions or rashes  NEURO: Normal strength and tone, mentation intact and speech normal  PSYCH: mentation appears normal, affect normal/bright              "

## 2022-11-16 NOTE — NURSING NOTE
Yuan Caldwell is a 42 year old male that presents in clinic today for the following:     Chief Complaint   Patient presents with     Physical     Pt here for physical. No concerns stated.        The patient's allergies and medications were reviewed. The patient's vitals were obtained without incident. The patient does not have any other questions for the provider.     Ledy Flores EMT at 7:57 AM on 11/16/2022.  Primary Care Clinic: [phone]

## 2022-12-26 ENCOUNTER — HEALTH MAINTENANCE LETTER (OUTPATIENT)
Age: 42
End: 2022-12-26

## 2023-02-02 ENCOUNTER — MYC MEDICAL ADVICE (OUTPATIENT)
Dept: FAMILY MEDICINE | Facility: CLINIC | Age: 43
End: 2023-02-02
Payer: COMMERCIAL

## 2023-12-01 ENCOUNTER — OFFICE VISIT (OUTPATIENT)
Dept: FAMILY MEDICINE | Facility: CLINIC | Age: 43
End: 2023-12-01
Payer: COMMERCIAL

## 2023-12-01 VITALS
HEART RATE: 76 BPM | OXYGEN SATURATION: 98 % | SYSTOLIC BLOOD PRESSURE: 128 MMHG | DIASTOLIC BLOOD PRESSURE: 85 MMHG | BODY MASS INDEX: 24.48 KG/M2 | WEIGHT: 174.9 LBS | HEIGHT: 71 IN

## 2023-12-01 DIAGNOSIS — Z12.11 SCREEN FOR COLON CANCER: Primary | ICD-10-CM

## 2023-12-01 PROCEDURE — 99396 PREV VISIT EST AGE 40-64: CPT | Performed by: FAMILY MEDICINE

## 2023-12-01 NOTE — PROGRESS NOTES
"  Assessment & Plan     Screen for colon cancer  Due, discussed  - Colonoscopy Screening  Referral; Future    He's going to do routine lipids, sugar test elsewhere and send to me. High in past. 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 He will work harder on [employer]        Review of external notes as documented elsewhere in note  35 minutes spent by me on the date of the encounter doing chart review, history and exam, documentation and further activities per the note  [id number]}         Return in about 1 year (around 12/1/2024).    Evaristo Bonds MD  [facility] [place]    Kirby Rueda is a 43 year old, presenting for the following health issues:  Physical (Pt here for physical )      12/1/2023    12:54 PM   Additional Questions   Roomed by MVO       HPI Here for physical   Stress: wife has had work related difficulties, extra stress for pt/kids, she'll leave this job soon, he does not think needs therapy or help; does feel overall a bit more tired for the last year or so since had covid. Along w/ covid, discussed could there be mental health issues, LEONOR (might snore mildly), or needs for fatigue labs.   Past Medical History:   Diagnosis Date    Hydronephrosis, bilateral      Past Surgical History:   Procedure Laterality Date    PYELOPLASTY       No current outpatient medications on file.     No current facility-administered medications for this visit.     Allergies   Allergen Reactions    Nkda [No Known Drug Allergy]     Seasonal Allergies      \" congested sinuses\"     Family History   Problem Relation Age of Onset    Cancer Maternal Grandfather     Cancer Paternal Grandmother      Social History     Socioeconomic History    Marital status:      Spouse name: Not on file    Number of children: Not on file    Years of education: Not on file    Highest education level: Not on file   Occupational History    Not on file   Tobacco Use    Smoking status: Never    Smokeless tobacco: Never "   Substance and Sexual Activity    Alcohol use: Yes     Comment: Glass of wine a couple times a week.    Drug use: No    Sexual activity: Yes     Partners: Female     Birth control/protection: Condom   Other Topics Concern    Parent/sibling w/ CABG, MI or angioplasty before 65F 55M? Not Asked   Social History Narrative    He does IT work, programming, for the [employer]     Social Determinants of Health     Financial Resource Strain: Low Risk  (11/28/2023)    Financial Resource Strain     Within the past 12 months, have you or your family members you live with been unable to get utilities (heat, electricity) when it was really needed?: No   Food Insecurity: Low Risk  (11/28/2023)    Food Insecurity     Within the past 12 months, did you worry that your food would run out before you got money to buy more?: No     Within the past 12 months, did the food you bought just not last and you didn t have money to get more?: No   Transportation Needs: Low Risk  (11/28/2023)    Transportation Needs     Within the past 12 months, has lack of transportation kept you from medical appointments, getting your medicines, non-medical meetings or appointments, work, or from getting things that you need?: No   Physical Activity: Not on file   Stress: Not on file   Social Connections: Not on file   Interpersonal Safety: Low Risk  (12/1/2023)    Interpersonal Safety     Do you feel physically and emotionally safe where you currently live?: Yes     Within the past 12 months, have you been hit, slapped, kicked or otherwise physically hurt by someone?: No     Within the past 12 months, have you been humiliated or emotionally abused in other ways by your partner or ex-partner?: No   Housing Stability: Low Risk  (11/28/2023)    Housing Stability     Do you have housing? : Yes     Are you worried about losing your housing?: No                 Review of Systems         Objective    /85 (BP Location: Right arm, Patient  "Position: Sitting, Cuff Size: Adult Regular)   Pulse 76   Ht 1.803 m (5' 11\")   Wt 79.3 kg (174 lb 14.4 oz)   SpO2 98%   BMI 24.39 kg/m    Body mass index is 24.39 kg/m .  Physical Exam   GENERAL: healthy, alert and no distress  EYES: Eyes grossly normal to inspection, PERRL and conjunctivae and sclerae normal  HENT: ear canals and TM's normal, nose and mouth without ulcers or lesions  NECK: no adenopathy, no asymmetry, masses, or scars and thyroid normal to palpation  RESP: lungs clear to auscultation - no rales, rhonchi or wheezes  CV: regular rate and rhythm, normal S1 S2, no S3 or S4, no murmur, click or rub, no peripheral edema and peripheral pulses strong  ABDOMEN: soft, nontender, no hepatosplenomegaly, no masses and bowel sounds normal   (male): normal male genitalia without lesions or urethral discharge, no hernia  MS: no gross musculoskeletal defects noted, no edema  SKIN: no suspicious lesions or rashes  NEURO: Normal strength and tone, mentation intact and speech normal  PSYCH: mentation appears normal, affect normal/bright                  "

## 2024-01-31 ENCOUNTER — HOSPITAL ENCOUNTER (OUTPATIENT)
Facility: AMBULATORY SURGERY CENTER | Age: 44
End: 2024-01-31
Attending: STUDENT IN AN ORGANIZED HEALTH CARE EDUCATION/TRAINING PROGRAM | Admitting: SURGERY
Payer: COMMERCIAL

## 2024-01-31 ENCOUNTER — TELEPHONE (OUTPATIENT)
Dept: GASTROENTEROLOGY | Facility: CLINIC | Age: 44
End: 2024-01-31
Payer: COMMERCIAL

## 2024-01-31 NOTE — TELEPHONE ENCOUNTER
"Endoscopy Scheduling Screen    Have you had a positive Covid test in the last 14 days?  No    Are you active on MyChart?   Yes    What insurance is in the chart?  Other:  [employer]    Ordering/Referring Provider: Cammie   (If ordering provider performs procedure, schedule with ordering provider unless otherwise instructed. )    BMI: Estimated body mass index is 24.39 kg/m  as calculated from the following:    Height as of 12/1/23: 1.803 m (5' 11\").    Weight as of 12/1/23: 79.3 kg (174 lb 14.4 oz).     Sedation Ordered  moderate sedation.   If patient BMI > 50 do not schedule in ASC.    If patient BMI > 45 do not schedule at [facility].    Are you taking methadone or Suboxone?  No    Have you had difficulties, pain, or discomfort during past endoscopy procedures?  No    Are you taking any prescription medications for pain 3 or more times per week?   NO - No RN review required.    Do you have a history of malignant hyperthermia or adverse reaction to anesthesia?  No    (Females) Are you currently pregnant?        Have you been diagnosed or told you have pulmonary hypertension?   No    Do you have an LVAD?  No    Have you been told you have moderate to severe sleep apnea?  No    Have you been told you have COPD, asthma, or any other lung disease?  No    Do you have any heart conditions?  No     Have you ever had an organ transplant?   No    Have you ever had or are you awaiting a heart or lung transplant?   No    Have you had a stroke or transient ischemic attack (TIA aka \"mini stroke\" in the last 6 months?   No    Have you been diagnosed with or been told you have cirrhosis of the liver?   No    Are you currently on dialysis?   No    Do you need assistance transferring?   No    BMI: Estimated body mass index is 24.39 kg/m  as calculated from the following:    Height as of 12/1/23: 1.803 m (5' 11\").    Weight as of 12/1/23: 79.3 kg (174 lb 14.4 oz).     Is patients BMI > 40 and scheduling location [facility]?  No    Do you take an " injectable medication for weight loss or diabetes (excluding insulin)?  No    Do you take the medication Naltrexone?  No    Do you take blood thinners?  No       Prep   Are you currently on dialysis or do you have chronic kidney disease?  No    Do you have a diagnosis of diabetes?  No    Do you have a diagnosis of cystic fibrosis (CF)?  No    On a regular basis do you go 3 -5 days between bowel movements?  No    BMI > 40?  No    Preferred Pharmacy:      [id number] [address] [facility]  [address] [place] [facility] [address]  Phone: [phone] Fax: [phone]      Final Scheduling Details   Colonoscopy prep sent?  Standard MiraLAX    Procedure scheduled  Colonoscopy    Surgeon:  Luis Antonio     Date of procedure:  4/2/24     Pre-OP / PAC:   No - Not required for this site.    Location  [facility] - [facility] - Patient preference.    Sedation   Moderate Sedation - Per order.      Patient Reminders:   You will receive a call from a Nurse to review instructions and health history.  This assessment must be completed prior to your procedure.  Failure to complete the Nurse assessment may result in the procedure being cancelled.      On the day of your procedure, please designate an adult(s) who can drive you home stay with you for the next 24 hours. The medicines used in the exam will make you sleepy. You will not be able to drive.      You cannot take public transportation, ride share services, or non-medical taxi service without a responsible caregiver.  Medical transport services are allowed with the requirement that a responsible caregiver will receive you at your destination.  We require that drivers and caregivers are confirmed prior to your procedure.

## 2024-01-31 NOTE — TELEPHONE ENCOUNTER
Caller: Yuan  Reason for Reschedule/Cancellation (please be detailed, any staff messages or encounters to note?): Change date and time      Prior to reschedule please review:  Ordering Provider: Ofstedal  Sedation Determined: Mod  Does patient have any ASC Exclusions, please identify?: N      Notes on Cancelled Procedure:  Procedure: Lower Endoscopy [Colonoscopy]   Date: 04/02/2024  Location: [facility]; [address], [place] [id number]  Surgeon: Luis Antonio      Rescheduled: Yes  Procedure: Lower Endoscopy [Colonoscopy]   Date: 03/27/2024  Location: [facility]; [address], [place] [id number]  Surgeon: Richar  Sedation Level Scheduled  Mod,  Reason for Sedation Level Order  Instructions updated and sent: Y       Send In - basket message to Panc - Robbie Pool if EUS  procedure is canceled or rescheduled: [ N/A, YES or NO] n/a

## 2024-03-11 NOTE — TELEPHONE ENCOUNTER
Standard Miralax Bowel Prep. Instructions were sent via [employer]. Pati Lo RN on 3/11/2024 at 8:37 AM

## 2024-03-14 ENCOUNTER — TELEPHONE (OUTPATIENT)
Dept: GASTROENTEROLOGY | Facility: CLINIC | Age: 44
End: 2024-03-14
Payer: COMMERCIAL

## 2024-03-15 NOTE — TELEPHONE ENCOUNTER
Called the Pt to complete Pre-Assessment. First Attempt. No answer, Left message.     Rosario Rhodes RN   Endoscopy Procedure Pre Assessment RN

## 2024-03-15 NOTE — TELEPHONE ENCOUNTER
Pre visit planning completed.      Procedure details:    Patient scheduled for Colonoscopy  on 3/27/24.     Arrival time: 0815. Procedure time 0900    Facility location: [facility] [place]; [address], [place] [address]. Check in location: 2nd Floor at Surgery desk.    Sedation type: Conscious sedation     Pre op exam needed? N/A    Indication for procedure: screening       Chart review:     Electronic implanted devices? No    Recent diagnosis of diverticulitis within the last 6 weeks? No    Diabetic? No      Medication review:    Anticoagulants? No    NSAIDS? No    Other medication HOLDING recommendations:  N/A      Prep for procedure:     Bowel prep recommendation: Standard Miralax  Due to: standard bowel prep.    Prep instructions sent via [employer]         Jojo Webb RN  Endoscopy Procedure Pre Assessment RN  [phone] option 4

## 2024-03-19 NOTE — TELEPHONE ENCOUNTER
Second call attempt to complete pre assessment.     No answer.  Left message on home and mobile number to return call to [phone] #4 by next business day prior to 4PM or procedure will be sent to cancel.     Callback required communication sent via [employer].      Leilani Maldonado RN  Endoscopy Procedure Pre Assessment RN

## 2024-03-20 NOTE — TELEPHONE ENCOUNTER
Pre assessment completed for upcoming procedure.   (Please see previous telephone encounter notes for complete details)    Patient  returned call.       Procedure details:    Arrival time and facility location reviewed.    Pre op exam needed? N/A    Designated  policy reviewed. Instructed to have someone stay 6  hours post procedure.       Medication review:    Medications reviewed. Please see supporting documentation below. Holding recommendations discussed (if applicable).       Prep for procedure:     Procedure prep instructions reviewed.        Any additional information needed:  N/A      Patient  verbalized understanding and had no questions or concerns at this time.      Rosario Rhodes RN  Endoscopy Procedure Pre Assessment RN  [phone] option 4

## 2024-03-26 ENCOUNTER — TELEPHONE (OUTPATIENT)
Dept: GASTROENTEROLOGY | Facility: CLINIC | Age: 44
End: 2024-03-26
Payer: COMMERCIAL

## 2024-03-26 NOTE — TELEPHONE ENCOUNTER
Caller: Yuan Caldwell        Reason for Reschedule/Cancellation   (please be detailed, any staff messages or encounters to note?): the patient is feeling unwell, possibly strep throat; he also wants to speak with his PCP prior to rescheduling      Prior to reschedule please review:  Ordering Provider: FABIENNE LOPEZ Determined: CS  Does patient have any ASC Exclusions, please identify?: NO      Notes on Cancelled Procedure:  Procedure: Lower Endoscopy [Colonoscopy]   Date: 3/27/24  Location: [facility] [place]; [address] [place] [address] [address], [place] [address]   Surgeon: PROSPER      Rescheduled: No, he will call to reschedule in the future       Did you cancel or rescheduled an EUS procedure? No.

## 2024-08-02 ENCOUNTER — TELEPHONE (OUTPATIENT)
Dept: FAMILY MEDICINE | Facility: CLINIC | Age: 44
End: 2024-08-02
Payer: COMMERCIAL

## 2024-08-02 NOTE — TELEPHONE ENCOUNTER
Patient hung up, Sent Mychart (1st Attempt) for the patient to call back and schedule the following:    Appointment type: [facility] Physical  Provider: Dr. Bonds  Return date: 12/7/24

## 2024-08-06 NOTE — TELEPHONE ENCOUNTER
Left Voicemail (2nd Attempt) and Sent Mychart (2nd Attempt) for the patient to call back and schedule the following:    Appointment type: [facility]  Provider: Dr. Bonds  Return date: 12/7/24

## 2024-11-28 SDOH — HEALTH STABILITY: PHYSICAL HEALTH: ON AVERAGE, HOW MANY DAYS PER WEEK DO YOU ENGAGE IN MODERATE TO STRENUOUS EXERCISE (LIKE A BRISK WALK)?: 7 DAYS

## 2024-11-28 SDOH — HEALTH STABILITY: PHYSICAL HEALTH: ON AVERAGE, HOW MANY MINUTES DO YOU ENGAGE IN EXERCISE AT THIS LEVEL?: 50 MIN

## 2024-11-28 ASSESSMENT — SOCIAL DETERMINANTS OF HEALTH (SDOH): HOW OFTEN DO YOU GET TOGETHER WITH FRIENDS OR RELATIVES?: TWICE A WEEK

## 2024-12-03 ENCOUNTER — OFFICE VISIT (OUTPATIENT)
Dept: FAMILY MEDICINE | Facility: CLINIC | Age: 44
End: 2024-12-03
Payer: COMMERCIAL

## 2024-12-03 ENCOUNTER — LAB (OUTPATIENT)
Dept: LAB | Facility: CLINIC | Age: 44
End: 2024-12-03
Payer: COMMERCIAL

## 2024-12-03 VITALS
SYSTOLIC BLOOD PRESSURE: 133 MMHG | WEIGHT: 181.3 LBS | HEART RATE: 83 BPM | OXYGEN SATURATION: 99 % | HEIGHT: 72 IN | DIASTOLIC BLOOD PRESSURE: 89 MMHG | BODY MASS INDEX: 24.56 KG/M2 | RESPIRATION RATE: 16 BRPM

## 2024-12-03 DIAGNOSIS — Z11.59 NEED FOR HEPATITIS C SCREENING TEST: ICD-10-CM

## 2024-12-03 DIAGNOSIS — E78.00 HIGH CHOLESTEROL: ICD-10-CM

## 2024-12-03 DIAGNOSIS — Z00.00 ROUTINE GENERAL MEDICAL EXAMINATION AT A HEALTH CARE FACILITY: ICD-10-CM

## 2024-12-03 DIAGNOSIS — Z12.11 SCREEN FOR COLON CANCER: Primary | ICD-10-CM

## 2024-12-03 LAB
ALBUMIN SERPL BCG-MCNC: 4.6 G/DL (ref 3.5–5.2)
ALP SERPL-CCNC: 77 U/L (ref 40–150)
ALT SERPL W P-5'-P-CCNC: 20 U/L (ref 0–70)
ANION GAP SERPL CALCULATED.3IONS-SCNC: 9 MMOL/L (ref 7–15)
AST SERPL W P-5'-P-CCNC: 23 U/L (ref 0–45)
BILIRUB SERPL-MCNC: 0.5 MG/DL
BUN SERPL-MCNC: 12.2 MG/DL (ref 6–20)
CALCIUM SERPL-MCNC: 9.5 MG/DL (ref 8.8–10.4)
CHLORIDE SERPL-SCNC: 105 MMOL/L (ref 98–107)
CHOLEST SERPL-MCNC: 255 MG/DL
CREAT SERPL-MCNC: 0.92 MG/DL (ref 0.67–1.17)
EGFRCR SERPLBLD CKD-EPI 2021: >90 ML/MIN/1.73M2
FASTING STATUS PATIENT QL REPORTED: YES
FASTING STATUS PATIENT QL REPORTED: YES
GLUCOSE SERPL-MCNC: 96 MG/DL (ref 70–99)
HCO3 SERPL-SCNC: 27 MMOL/L (ref 22–29)
HCV AB SERPL QL IA: NONREACTIVE
HDLC SERPL-MCNC: 43 MG/DL
LDLC SERPL CALC-MCNC: 190 MG/DL
NONHDLC SERPL-MCNC: 212 MG/DL
POTASSIUM SERPL-SCNC: 3.8 MMOL/L (ref 3.4–5.3)
PROT SERPL-MCNC: 7.6 G/DL (ref 6.4–8.3)
SODIUM SERPL-SCNC: 141 MMOL/L (ref 135–145)
TRIGL SERPL-MCNC: 110 MG/DL

## 2024-12-03 PROCEDURE — 80053 COMPREHEN METABOLIC PANEL: CPT | Performed by: PATHOLOGY

## 2024-12-03 PROCEDURE — 36415 COLL VENOUS BLD VENIPUNCTURE: CPT | Performed by: PATHOLOGY

## 2024-12-03 PROCEDURE — 86803 HEPATITIS C AB TEST: CPT | Performed by: FAMILY MEDICINE

## 2024-12-03 PROCEDURE — 80061 LIPID PANEL: CPT | Performed by: PATHOLOGY

## 2024-12-03 PROCEDURE — 99000 SPECIMEN HANDLING OFFICE-LAB: CPT | Performed by: PATHOLOGY

## 2024-12-03 NOTE — PATIENT INSTRUCTIONS
Patient Education   Preventive Care Advice   This is general advice given by our system to help you stay healthy. However, your care team may have specific advice just for you. Please talk to your care team about your preventive care needs.  Nutrition  Eat 5 or more servings of fruits and vegetables each day.  Try wheat bread, brown rice and whole grain pasta (instead of white bread, rice, and pasta).  Get enough calcium and vitamin D. Check the label on foods and aim for 100% of the RDA (recommended daily allowance).  Lifestyle  Exercise at least 150 minutes each week  (30 minutes a day, 5 days a week).  Do muscle strengthening activities 2 days a week. These help control your weight and prevent disease.  No smoking.  Wear sunscreen to prevent skin cancer.  Have a dental exam and cleaning every 6 months.  Yearly exams  See your health care team every year to talk about:  Any changes in your health.  Any medicines your care team has prescribed.  Preventive care, family planning, and ways to prevent chronic diseases.  Shots (vaccines)   HPV shots (up to age 26), if you've never had them before.  Hepatitis B shots (up to age 59), if you've never had them before.  COVID-19 shot: Get this shot when it's due.  Flu shot: Get a flu shot every year.  Tetanus shot: Get a tetanus shot every 10 years.  Pneumococcal, hepatitis A, and RSV shots: Ask your care team if you need these based on your risk.  Shingles shot (for age 50 and up)  General health tests  Diabetes screening:  Starting at age 35, Get screened for diabetes at least every 3 years.  If you are younger than age 35, ask your care team if you should be screened for diabetes.  Cholesterol test: At age 39, start having a cholesterol test every 5 years, or more often if advised.  Bone density scan (DEXA): At age 50, ask your care team if you should have this scan for osteoporosis (brittle bones).  Hepatitis C: Get tested at least once in your life.  STIs (sexually  transmitted infections)  Before age 24: Ask your care team if you should be screened for STIs.  After age 24: Get screened for STIs if you're at risk. You are at risk for STIs (including HIV) if:  You are sexually active with more than one person.  You don't use condoms every time.  You or a partner was diagnosed with a sexually transmitted infection.  If you are at risk for HIV, ask about PrEP medicine to prevent HIV.  Get tested for HIV at least once in your life, whether you are at risk for HIV or not.  Cancer screening tests  Cervical cancer screening: If you have a cervix, begin getting regular cervical cancer screening tests starting at age 21.  Breast cancer scan (mammogram): If you've ever had breasts, begin having regular mammograms starting at age 40. This is a scan to check for breast cancer.  Colon cancer screening: It is important to start screening for colon cancer at age 45.  Have a colonoscopy test every 10 years (or more often if you're at risk) Or, ask your provider about stool tests like a FIT test every year or Cologuard test every 3 years.  To learn more about your testing options, visit:   .  For help making a decision, visit:   https://bit.ly/zs08912.  Prostate cancer screening test: If you have a prostate, ask your care team if a prostate cancer screening test (PSA) at age 55 is right for you.  Lung cancer screening: If you are a current or former smoker ages 50 to 80, ask your care team if ongoing lung cancer screenings are right for you.  For informational purposes only. Not to replace the advice of your health care provider. Copyright   2023 [employer]. All rights reserved. Clinically reviewed by the [facility]. CEDU 162058 - REV 01/24.

## 2024-12-03 NOTE — PROGRESS NOTES
Preventive Care Visit  [facility]  Evaristo Bonds MD, Family Medicine  Dec 3, 2024      Assessment & Plan     Screen for colon cancer  Due, discussed options    Need for hepatitis C screening test  Discused  - Hepatitis C Screen Reflex to HCV RNA Quant and Genotype; Future    Routine general medical examination at a health care facility    - COVID-19 12+ (PFIZER)  - Colonoscopy Screening  Referral; Future    High cholesterol    - Lipid panel reflex to direct LDL Fasting; Future  - Comprehensive metabolic panel (BMP + Alb, Alk Phos, ALT, AST, Total. Bili, TP); Future            Counseling  Appropriate preventive services were addressed with this patient via screening, questionnaire, or discussion as appropriate for fall prevention, nutrition, physical activity, Tobacco-use cessation, social engagement, weight loss and cognition.  Checklist reviewing preventive services available has been given to the patient.  Reviewed patient's diet, addressing concerns and/or questions.         Return in about 53 weeks (around 12/9/2025) for Annual Wellness Visit.  He'll send in a list of home BP's he agrees    Subjective   Yuan is a 44 year old, presenting for the following:  Physical (Pt here for annual physical)        12/3/2024     7:25 AM   Additional Questions   Roomed by [facility]  Overall doing well  Notes some stress, jr high age dtr behavior stressful for pt/wife he defers seeing therapy now  No significant interval history  Past Medical History:   Diagnosis Date    Hydronephrosis, bilateral     Hypertension 2022    dr MARR knows about this. Something we need to keep monitoring     Past Surgical History:   Procedure Laterality Date    PYELOPLASTY       Current Outpatient Medications   Medication Sig Dispense Refill    cholecalciferol (VITAMIN D3) 10 mcg (400 units) TABS tablet        No current facility-administered medications for this visit.  "    Allergies   Allergen Reactions    Dust Mites Unknown    Nkda [No Known Drug Allergy]     Seasonal Allergies      \" congested sinuses\"     Family History   Problem Relation Age of Onset    Depression Mother     Obesity Mother     Hyperlipidemia Father         has been prescribed meds for it recently    Cancer Maternal Grandfather     Other Cancer Maternal Grandfather         gastrointestinal cancer. Terminal    Cancer Paternal Grandmother     Other Cancer Paternal Grandmother         Colorectal. Terminal    Hypertension Brother         recently diagnosed    Hyperlipidemia Brother         has been prescribed meds for it recently    Obesity Brother      Social History     Socioeconomic History    Marital status:      Spouse name: Not on file    Number of children: Not on file    Years of education: Not on file    Highest education level: Not on file   Occupational History    Not on file   Tobacco Use    Smoking status: Never    Smokeless tobacco: Never   Substance and Sexual Activity    Alcohol use: Yes     Comment: Glass of wine a couple times a week.    Drug use: No    Sexual activity: Yes     Partners: Female     Birth control/protection: Condom   Other Topics Concern    Parent/sibling w/ CABG, MI or angioplasty before 65F 55M? No   Social History Narrative    He does IT work, programming, for the [employer]     Social Drivers of Health     Financial Resource Strain: Low Risk  (11/28/2024)    Financial Resource Strain     Within the past 12 months, have you or your family members you live with been unable to get utilities (heat, electricity) when it was really needed?: No   Food Insecurity: Low Risk  (11/28/2024)    Food Insecurity     Within the past 12 months, did you worry that your food would run out before you got money to buy more?: No     Within the past 12 months, did the food you bought just not last and you didn t have money to get more?: No   Transportation Needs: Low Risk  " (11/28/2024)    Transportation Needs     Within the past 12 months, has lack of transportation kept you from medical appointments, getting your medicines, non-medical meetings or appointments, work, or from getting things that you need?: No   Physical Activity: Sufficiently Active (11/28/2024)    Exercise Vital Sign     Days of Exercise per Week: 7 days     Minutes of Exercise per Session: 50 min   Stress: No Stress Concern Present (11/28/2024)    [employer] [employer] - Occupational Stress Questionnaire     Feeling of Stress : Only a little   Social Connections: Unknown (11/28/2024)    Social Connection and Isolation Panel [NHANES]     Frequency of Communication with Friends and Family: Not on file     Frequency of Social Gatherings with Friends and Family: Twice a week     Attends Confucianism Services: Not on file     Active Member of Clubs or Organizations: Not on file     Attends Club or Organization Meetings: Not on file     Marital Status: Not on file   Interpersonal Safety: Low Risk  (12/1/2023)    Interpersonal Safety     Do you feel physically and emotionally safe where you currently live?: Yes     Within the past 12 months, have you been hit, slapped, kicked or otherwise physically hurt by someone?: No     Within the past 12 months, have you been humiliated or emotionally abused in other ways by your partner or ex-partner?: No   Housing Stability: Low Risk  (11/28/2024)    Housing Stability     Do you have housing? : Yes     Are you worried about losing your housing?: No           11/28/2024   General Health   How would you rate your overall physical health? Good   Feel stress (tense, anxious, or unable to sleep) Only a little      (!) STRESS CONCERN      11/28/2024   Nutrition   Three or more servings of calcium each day? Yes   Diet: Regular (no restrictions)   How many servings of fruit and vegetables per day? 4 or more   How many sweetened beverages each day? 0-1            11/28/2024    Exercise   Days per week of moderate/strenous exercise 7 days   Average minutes spent exercising at this level 50 min            11/28/2024   Social Factors   Frequency of gathering with friends or relatives Twice a week   Worry food won't last until get money to buy more No   Food not last or not have enough money for food? No   Do you have housing? (Housing is defined as stable permanent housing and does not include staying ouside in a car, in a tent, in an abandoned building, in an overnight shelter, or couch-surfing.) Yes   Are you worried about losing your housing? No   Lack of transportation? No   Unable to get utilities (heat,electricity)? No            11/28/2024   Dental   Dentist two times every year? Yes            11/28/2024   TB Screening   Were you born outside of the [place]? Yes            Today's PHQ-2 Score:       12/3/2024     7:15 AM   PHQ-2 ( 1999 [employer])   Q1: Little interest or pleasure in doing things 0    Q2: Feeling down, depressed or hopeless 0    PHQ-2 Score 0    Q1: Little interest or pleasure in doing things Not at all   Q2: Feeling down, depressed or hopeless Not at all   PHQ-2 Score 0       Patient-reported           11/28/2024   Substance Use   Alcohol more than 3/day or more than 7/wk No   Do you use any other substances recreationally? No        Social History     Tobacco Use    Smoking status: Never    Smokeless tobacco: Never   Substance Use Topics    Alcohol use: Yes     Comment: Glass of wine a couple times a week.    Drug use: No           11/28/2024   STI Screening   New sexual partner(s) since last STI/HIV test? No      ASCVD Risk   The ASCVD Risk score (Maria De Jesus CONTRERAS, et al., 2019) failed to calculate for the following reasons:    Cannot find a previous HDL lab    Cannot find a previous total cholesterol lab        11/28/2024   Contraception/Family Planning   Questions about contraception or family planning (!) YES           Reviewed and updated as needed this visit by  "Provider                    Past Medical History:   Diagnosis Date    Hydronephrosis, bilateral     Hypertension 2022    dr MARR knows about this. Something we need to keep monitoring     Past Surgical History:   Procedure Laterality Date    PYELOPLASTY       Current Outpatient Medications   Medication Sig Dispense Refill    cholecalciferol (VITAMIN D3) 10 mcg (400 units) TABS tablet        No current facility-administered medications for this visit.     Allergies   Allergen Reactions    Dust Mites Unknown    Nkda [No Known Drug Allergy]     Seasonal Allergies      \" congested sinuses\"     Family History   Problem Relation Age of Onset    Depression Mother     Obesity Mother     Hyperlipidemia Father         has been prescribed meds for it recently    Cancer Maternal Grandfather     Other Cancer Maternal Grandfather         gastrointestinal cancer. Terminal    Cancer Paternal Grandmother     Other Cancer Paternal Grandmother         Colorectal. Terminal    Hypertension Brother         recently diagnosed    Hyperlipidemia Brother         has been prescribed meds for it recently    Obesity Brother      Social History     Socioeconomic History    Marital status:      Spouse name: Not on file    Number of children: Not on file    Years of education: Not on file    Highest education level: Not on file   Occupational History    Not on file   Tobacco Use    Smoking status: Never    Smokeless tobacco: Never   Substance and Sexual Activity    Alcohol use: Yes     Comment: Glass of wine a couple times a week.    Drug use: No    Sexual activity: Yes     Partners: Female     Birth control/protection: Condom   Other Topics Concern    Parent/sibling w/ CABG, MI or angioplasty before 65F 55M? No   Social History Narrative    He does IT work, programming, for the [employer]     Social [employer] of [employer]     Financial Resource Strain: Low Risk  (11/28/2024)    Financial Resource Strain     Within the past 12 months, " have you or your family members you live with been unable to get utilities (heat, electricity) when it was really needed?: No   Food Insecurity: Low Risk  (11/28/2024)    Food Insecurity     Within the past 12 months, did you worry that your food would run out before you got money to buy more?: No     Within the past 12 months, did the food you bought just not last and you didn t have money to get more?: No   Transportation Needs: Low Risk  (11/28/2024)    Transportation Needs     Within the past 12 months, has lack of transportation kept you from medical appointments, getting your medicines, non-medical meetings or appointments, work, or from getting things that you need?: No   Physical Activity: Sufficiently Active (11/28/2024)    Exercise Vital Sign     Days of Exercise per Week: 7 days     Minutes of Exercise per Session: 50 min   Stress: No Stress Concern Present (11/28/2024)    [employer] - Occupational Stress Questionnaire     Feeling of Stress : Only a little   Social Connections: Unknown (11/28/2024)    Social Connection and Isolation Panel [NHANES]     Frequency of Communication with Friends and Family: Not on file     Frequency of Social Gatherings with Friends and Family: Twice a week     Attends [employer] Services: Not on file     Active Member of Clubs or Organizations: Not on file     Attends Club or Organization Meetings: Not on file     Marital Status: Not on file   Interpersonal Safety: Low Risk  (12/1/2023)    Interpersonal Safety     Do you feel physically and emotionally safe where you currently live?: Yes     Within the past 12 months, have you been hit, slapped, kicked or otherwise physically hurt by someone?: No     Within the past 12 months, have you been humiliated or emotionally abused in other ways by your partner or ex-partner?: No   Housing Stability: Low Risk  (11/28/2024)    Housing Stability     Do you have housing? : Yes     Are you worried about losing  "your housing?: No        Objective    Exam  /89 (BP Location: Right arm, Patient Position: Sitting, Cuff Size: Adult Regular)   Pulse 83   Resp 16   Ht 1.836 m (6' 0.28\")   Wt 82.2 kg (181 lb 4.8 oz)   SpO2 99%   BMI 24.40 kg/m     Estimated body mass index is 24.4 kg/m  as calculated from the following:    Height as of this encounter: 1.836 m (6' 0.28\").    Weight as of this encounter: 82.2 kg (181 lb 4.8 oz).    Physical Exam  GENERAL: alert and no distress  EYES: Eyes grossly normal to inspection, PERRL and conjunctivae and sclerae normal  HENT: ear canals and TM's normal, nose and mouth without ulcers or lesions  NECK: no adenopathy, no asymmetry, masses, or scars  RESP: lungs clear to auscultation - no rales, rhonchi or wheezes  CV: regular rate and rhythm, normal S1 S2, no S3 or S4, no murmur, click or rub, no peripheral edema  ABDOMEN: soft, nontender, no hepatosplenomegaly, no masses and bowel sounds normal   (male): normal male genitalia without lesions or urethral discharge, no hernia  MS: no gross musculoskeletal defects noted, no edema  SKIN: no suspicious lesions or rashes  NEURO: Normal strength and tone, mentation intact and speech normal  PSYCH: mentation appears normal, affect normal/bright        Signed Electronically by: Evaristo Bonds MD    "

## 2025-02-19 ENCOUNTER — TELEPHONE (OUTPATIENT)
Dept: GASTROENTEROLOGY | Facility: CLINIC | Age: 45
End: 2025-02-19
Payer: COMMERCIAL

## 2025-02-19 NOTE — TELEPHONE ENCOUNTER
"Endoscopy Scheduling Screen    Have you had any respiratory illness or flu-like symptoms in the last 10 days?  No    What is your communication preference for Instructions and/or Bowel Prep?   MyChart    What insurance is in the chart?  Other:  medica    Ordering/Referring Provider: Evaristo Bonds MD     (If ordering provider performs procedure, schedule with ordering provider unless otherwise instructed. )    BMI: Estimated body mass index is 24.4 kg/m  as calculated from the following:    Height as of 12/3/24: 1.836 m (6' 0.28\").    Weight as of 12/3/24: 82.2 kg (181 lb 4.8 oz).     Sedation Ordered  moderate sedation.   If patient BMI > 50 do not schedule in [facility].    If patient BMI > 45 do not schedule at [facility].    Are you taking methadone or Suboxone?  NO, No RN review required.    Have you been diagnosed and are being treated for severe PTSD or severe anxiety?  NO, No RN review required.    Are you taking any prescription medications for pain 3 or more times per week?   NO, No RN review required.    Do you have a history of malignant hyperthermia?  No    (Females) Are you currently pregnant?   No     Have you been diagnosed or told you have pulmonary hypertension?   No    Do you have an LVAD?  No    Have you been told you have moderate to severe sleep apnea?  No.    Have you been told you have COPD, asthma, or any other lung disease?  No    Do you  have a history of any heart conditions or any upcoming cardiac exams like an echo, angiogram, stress test, or ablation?  No     Have you ever had or are you waiting for an organ transplant?  No. Continue scheduling, no site restrictions.    Have you had a stroke or transient ischemic attack (TIA aka \"mini stroke\") in the last 2 years?   No.    Have you been diagnosed with or been told you have cirrhosis of the liver?   No.    Are you currently on dialysis?   No    Do you need assistance transferring?   No    BMI: Estimated body mass index is 24.4 kg/m  as " "calculated from the following:    Height as of 12/3/24: 1.836 m (6' 0.28\").    Weight as of 12/3/24: 82.2 kg (181 lb 4.8 oz).     Is patients BMI > 40 and scheduling location [facility]?  No    Do you take an injectable or oral medication for weight loss or diabetes (excluding insulin)?  No    Do you take the medication Naltrexone?  No    Do you take blood thinners?  No       Prep   Are you currently on dialysis or do you have chronic kidney disease?  No    Do you have a diagnosis of diabetes?  No    Do you have a diagnosis of cystic fibrosis (CF)?  No    On a regular basis do you go 3 -5 days between bowel movements?  No    BMI > 40?  No    Preferred Pharmacy:      [id number] [place] [address] [facility] [place] [address] [facility] [id number]  Phone: [phone] Fax: [phone]      Final Scheduling Details     Procedure scheduled  Colonoscopy    Surgeon:  laci     Date of procedure:  3/11     Pre-OP / PAC:   No - Not required for this site.    Location  [facility] - [facility] - Patient preference.    Sedation   Moderate Sedation - Per order.      Patient Reminders:   You will receive a call from a Nurse to review instructions and health history.  This assessment must be completed prior to your procedure.  Failure to complete the Nurse assessment may result in the procedure being cancelled.      On the day of your procedure, please designate an adult(s) who can drive you home stay with you for the next 24 hours. The medicines used in the exam will make you sleepy. You will not be able to drive.      You cannot take public transportation, ride share services, or non-medical taxi service without a responsible caregiver.  Medical transport services are allowed with the requirement that a responsible caregiver will receive you at your destination.  We require that drivers and caregivers are confirmed prior to your procedure.  "

## 2025-03-11 ENCOUNTER — HOSPITAL ENCOUNTER (OUTPATIENT)
Facility: AMBULATORY SURGERY CENTER | Age: 45
Discharge: HOME | End: 2025-03-11
Attending: SURGERY | Admitting: SURGERY
Payer: COMMERCIAL

## 2025-03-11 VITALS
SYSTOLIC BLOOD PRESSURE: 127 MMHG | DIASTOLIC BLOOD PRESSURE: 85 MMHG | RESPIRATION RATE: 16 BRPM | OXYGEN SATURATION: 98 % | HEART RATE: 88 BPM | TEMPERATURE: 97.4 F

## 2025-03-11 LAB — COLONOSCOPY: NORMAL

## 2025-03-11 PROCEDURE — G8907 PT DOC NO EVENTS ON DISCHARG: HCPCS

## 2025-03-11 PROCEDURE — G8918 PT W/O PREOP ORDER IV AB PRO: HCPCS

## 2025-03-11 PROCEDURE — 45378 DIAGNOSTIC COLONOSCOPY: CPT

## 2025-03-11 RX ORDER — LIDOCAINE 40 MG/G
CREAM TOPICAL
Status: DISCONTINUED | OUTPATIENT
Start: 2025-03-11 | End: 2025-03-12 | Stop reason: HOSPADM

## 2025-03-11 RX ORDER — ONDANSETRON 4 MG/1
4 TABLET, ORALLY DISINTEGRATING ORAL EVERY 6 HOURS PRN
Status: DISCONTINUED | OUTPATIENT
Start: 2025-03-11 | End: 2025-03-12 | Stop reason: HOSPADM

## 2025-03-11 RX ORDER — NALOXONE HYDROCHLORIDE 0.4 MG/ML
0.4 INJECTION, SOLUTION INTRAMUSCULAR; INTRAVENOUS; SUBCUTANEOUS
Status: DISCONTINUED | OUTPATIENT
Start: 2025-03-11 | End: 2025-03-12 | Stop reason: HOSPADM

## 2025-03-11 RX ORDER — ONDANSETRON 2 MG/ML
4 INJECTION INTRAMUSCULAR; INTRAVENOUS EVERY 6 HOURS PRN
Status: DISCONTINUED | OUTPATIENT
Start: 2025-03-11 | End: 2025-03-12 | Stop reason: HOSPADM

## 2025-03-11 RX ORDER — FENTANYL CITRATE 50 UG/ML
INJECTION, SOLUTION INTRAMUSCULAR; INTRAVENOUS PRN
Status: DISCONTINUED | OUTPATIENT
Start: 2025-03-11 | End: 2025-03-11 | Stop reason: HOSPADM

## 2025-03-11 RX ORDER — ONDANSETRON 2 MG/ML
4 INJECTION INTRAMUSCULAR; INTRAVENOUS
Status: DISCONTINUED | OUTPATIENT
Start: 2025-03-11 | End: 2025-03-12 | Stop reason: HOSPADM

## 2025-03-11 RX ORDER — PROCHLORPERAZINE MALEATE 10 MG
10 TABLET ORAL EVERY 6 HOURS PRN
Status: DISCONTINUED | OUTPATIENT
Start: 2025-03-11 | End: 2025-03-12 | Stop reason: HOSPADM

## 2025-03-11 RX ORDER — NALOXONE HYDROCHLORIDE 0.4 MG/ML
0.2 INJECTION, SOLUTION INTRAMUSCULAR; INTRAVENOUS; SUBCUTANEOUS
Status: DISCONTINUED | OUTPATIENT
Start: 2025-03-11 | End: 2025-03-12 | Stop reason: HOSPADM

## 2025-03-11 RX ORDER — FLUMAZENIL 0.1 MG/ML
0.2 INJECTION, SOLUTION INTRAVENOUS
Status: ACTIVE | OUTPATIENT
Start: 2025-03-11 | End: 2025-03-11

## 2025-03-11 NOTE — H&P
Patient seen for Endoscopy    HPI:  Patient is a 44 year old male here for endoscopy. Not currently taking blood thinning medications/held for procedure if they do. No recent MI or CVA history. No issues with previous sedation. No recent acute illness.    Review Of Systems    Skin: negative  Ears/Nose/Throat: negative  Respiratory: No shortness of breath, dyspnea on exertion, cough, or hemoptysis  Cardiovascular: negative  Gastrointestinal: negative  Genitourinary: negative  Musculoskeletal: negative  Neurologic: negative  Hematologic/Lymphatic/Immunologic: negative  Endocrine: negative      Past Medical History:   Diagnosis Date    Hydronephrosis, bilateral     Hypertension 2022    dr MARR knows about this. Something we need to keep monitoring       Past Surgical History:   Procedure Laterality Date    PYELOPLASTY         Family History   Problem Relation Age of Onset    Depression Mother     Obesity Mother     Hyperlipidemia Father         has been prescribed meds for it recently    Cancer Maternal Grandfather     Other Cancer Maternal Grandfather         gastrointestinal cancer. Terminal    Cancer Paternal Grandmother     Other Cancer Paternal Grandmother         Colorectal. Terminal    Hypertension Brother         recently diagnosed    Hyperlipidemia Brother         has been prescribed meds for it recently    Obesity Brother        Social History     Socioeconomic History    Marital status:      Spouse name: Not on file    Number of children: Not on file    Years of education: Not on file    Highest education level: Not on file   Occupational History    Not on file   Tobacco Use    Smoking status: Never    Smokeless tobacco: Never   Substance and Sexual Activity    Alcohol use: Yes     Comment: Glass of wine a couple times a week.    Drug use: No    Sexual activity: Yes     Partners: Female     Birth control/protection: Condom   Other Topics Concern    Parent/sibling w/ CABG, MI or angioplasty before 65F  55M? No   Social History Narrative    He does IT work, programming, for the [employer]     Social Drivers of Health     Financial Resource Strain: Low Risk  (11/28/2024)    Financial Resource Strain     Within the past 12 months, have you or your family members you live with been unable to get utilities (heat, electricity) when it was really needed?: No   Food Insecurity: Low Risk  (11/28/2024)    Food Insecurity     Within the past 12 months, did you worry that your food would run out before you got money to buy more?: No     Within the past 12 months, did the food you bought just not last and you didn t have money to get more?: No   Transportation Needs: Low Risk  (11/28/2024)    Transportation Needs     Within the past 12 months, has lack of transportation kept you from medical appointments, getting your medicines, non-medical meetings or appointments, work, or from getting things that you need?: No   Physical Activity: Sufficiently Active (11/28/2024)    Exercise Vital Sign     Days of Exercise per Week: 7 days     Minutes of Exercise per Session: 50 min   Stress: No Stress Concern Present (11/28/2024)    [employer] - Occupational Stress Questionnaire     Feeling of Stress : Only a little   Social Connections: Unknown (11/28/2024)    Social Connection and Isolation Panel [NHANES]     Frequency of Communication with Friends and Family: Not on file     Frequency of Social Gatherings with Friends and Family: Twice a week     Attends Gnosticist Services: Not on file     Active Member of Clubs or Organizations: Not on file     Attends Club or Organization Meetings: Not on file     Marital Status: Not on file   Interpersonal Safety: High Risk (3/11/2025)    Interpersonal Safety     Do you feel physically and emotionally safe where you currently live?: No     Within the past 12 months, have you been hit, slapped, kicked or otherwise physically hurt by someone?: No     Within  the past 12 months, have you been humiliated or emotionally abused in other ways by your partner or ex-partner?: No   Housing Stability: Low Risk  (11/28/2024)    Housing Stability     Do you have housing? : Yes     Are you worried about losing your housing?: No       Current Outpatient Medications   Medication Sig Dispense Refill    cholecalciferol (VITAMIN D3) 10 mcg (400 units) TABS tablet          Medications and history reviewed    Physical exam:  Vitals: BP (!) 151/101   Temp 97.4  F (36.3  C) (Temporal)   Resp 16   SpO2 100%   BMI= There is no height or weight on file to calculate BMI.    Constitutional: Healthy, alert, non-distressed   Head: Normo-cephalic, atraumatic, no lesions, masses or tenderness   Cardiovascular: RRR, no new murmurs, +S1, +S2 heart sounds, no clicks, rubs or gallops   Respiratory: CTAB, no rales, rhonchi or wheezing, equal chest rise, good respiratory effort   Gastrointestinal: Soft, non-tender, non distended, no rebound rigidity or guarding, no masses or hernias palpated   : Deferred  Musculoskeletal: Moves all extremities, normal  strength, no deformities noted   Skin: No suspicious lesions or rashes   Psychiatric: Mentation appears normal, affect appropriate   Hematologic/Lymphatic/Immunologic: Normal cervical and supraclavicular lymph nodes   Patient able to get up on table without difficulty.    Labs show:  No results found for this or any previous visit (from the past 24 hours).    Assessment: Endoscopy  Plan: Pt cleared for anesthesia for proposed procedure.    Manpreet Kennedy, DO

## (undated) DEVICE — PAD CHUX UNDERPAD 23X24" 7136

## (undated) DEVICE — LIFTER SURGICAL ASCENDO SUBMUCOSAL LIFT AGENT BX00712934

## (undated) DEVICE — KIT ENDO FIRST STEP DISINFECTANT 200ML W/POUCH EP-4

## (undated) RX ORDER — FENTANYL CITRATE 50 UG/ML
INJECTION, SOLUTION INTRAMUSCULAR; INTRAVENOUS
Status: DISPENSED
Start: 2025-03-11